# Patient Record
Sex: FEMALE | Race: WHITE | NOT HISPANIC OR LATINO | Employment: OTHER | URBAN - METROPOLITAN AREA
[De-identification: names, ages, dates, MRNs, and addresses within clinical notes are randomized per-mention and may not be internally consistent; named-entity substitution may affect disease eponyms.]

---

## 2017-06-20 ENCOUNTER — ALLSCRIPTS OFFICE VISIT (OUTPATIENT)
Dept: OTHER | Facility: OTHER | Age: 77
End: 2017-06-20

## 2017-08-01 ENCOUNTER — GENERIC CONVERSION - ENCOUNTER (OUTPATIENT)
Dept: OTHER | Facility: OTHER | Age: 77
End: 2017-08-01

## 2017-08-02 ENCOUNTER — ALLSCRIPTS OFFICE VISIT (OUTPATIENT)
Dept: OTHER | Facility: OTHER | Age: 77
End: 2017-08-02

## 2017-08-02 DIAGNOSIS — N39.0 URINARY TRACT INFECTION: ICD-10-CM

## 2017-08-02 DIAGNOSIS — E11.9 TYPE 2 DIABETES MELLITUS WITHOUT COMPLICATIONS (HCC): ICD-10-CM

## 2017-08-02 DIAGNOSIS — I10 ESSENTIAL (PRIMARY) HYPERTENSION: ICD-10-CM

## 2017-08-07 ENCOUNTER — GENERIC CONVERSION - ENCOUNTER (OUTPATIENT)
Dept: OTHER | Facility: OTHER | Age: 77
End: 2017-08-07

## 2017-08-11 ENCOUNTER — GENERIC CONVERSION - ENCOUNTER (OUTPATIENT)
Dept: OTHER | Facility: OTHER | Age: 77
End: 2017-08-11

## 2017-08-15 ENCOUNTER — GENERIC CONVERSION - ENCOUNTER (OUTPATIENT)
Dept: OTHER | Facility: OTHER | Age: 77
End: 2017-08-15

## 2017-08-24 ENCOUNTER — GENERIC CONVERSION - ENCOUNTER (OUTPATIENT)
Dept: OTHER | Facility: OTHER | Age: 77
End: 2017-08-24

## 2017-09-22 ENCOUNTER — ALLSCRIPTS OFFICE VISIT (OUTPATIENT)
Dept: OTHER | Facility: OTHER | Age: 77
End: 2017-09-22

## 2017-09-22 DIAGNOSIS — I50.30 DIASTOLIC CONGESTIVE HEART FAILURE (HCC): ICD-10-CM

## 2017-09-22 DIAGNOSIS — K26.4 DUODENAL ULCER WITH HEMORRHAGE: ICD-10-CM

## 2017-09-22 DIAGNOSIS — J44.9 CHRONIC OBSTRUCTIVE PULMONARY DISEASE (HCC): ICD-10-CM

## 2017-09-22 DIAGNOSIS — R19.7 DIARRHEA: ICD-10-CM

## 2017-09-22 DIAGNOSIS — R93.89 ABNORMAL FINDINGS ON DIAGNOSTIC IMAGING OF OTHER SPECIFIED BODY STRUCTURES: ICD-10-CM

## 2017-10-27 ENCOUNTER — ALLSCRIPTS OFFICE VISIT (OUTPATIENT)
Dept: OTHER | Facility: OTHER | Age: 77
End: 2017-10-27

## 2017-10-28 NOTE — PROGRESS NOTES
Assessment  1  Anemia, pernicious (281 0) (D51 0)   2  (HFpEF) heart failure with preserved ejection fraction (428 9) (I50 30)   3  Type 2 diabetes mellitus (250 00) (E11 9)    Plan  (HFpEF) heart failure with preserved ejection fraction    · Gradient compression stocking, below knee, 15-20 mm Hg, each; Status:Complete;    Done: 27Oct2017  Anemia, pernicious    · BD Luer-Michael Syringe 26G X 5/8 3 ML Miscellaneous; USE AS DIRECTED   · Cyanocobalamin 1000 MCG/ML Injection Solution; Inject 1 ml weekly x 1 month,  then 1 ml every other week x 4 weeks, then monthly  Gastrointestinal hemorrhage associated with duodenal ulcer    · Omeprazole 20 MG Oral Capsule Delayed Release   · Omeprazole 40 MG Oral Capsule Delayed Release; TAKE 1 CAPSULE TWICE  DAILY  Sciatica of right side    · From  Lidocaine 5 % External Patch  To Lidocaine 5 % External Patch APPLY 1  PATCH TO THE AFFECTED AREA AND LEAVE IN PLACE FOR 12 HOURS, THEN  REMOVE AND LEAVE OFF FOR 12 HOURS   · Gabapentin 100 MG Oral Capsule; take 2 capsules 3 times daily    Discussion/Summary    Today, take an extra water pill  Tomorrow and Sunday, take an extra half of a water pill  Chief Complaint  PT HERE FOR FOLLOW UP APPT  PT C/O BACK PAIN, SCIATICA  WANTS SCRIPT FOR LIDOCAINE   Patient is here today for follow up of chronic conditions described in HPI  History of Present Illness  She has macrocytic anemia  She states she used to get B12 shots but has not had them in a while  She has been taking oral B12 without much benefit  She had a a GI bleed in September requiring hospitalization  She has no CP or SOB  has LBP that radiates down the right leg  She is taking Tylenol with good relief  She gets home PT  She is taking gabapentin with fair relief   She does not want Tylenol with codeine or tramadol  DM is under good control  She has no hypoglycemia on her current regimen        Review of Systems    Constitutional: No fever, no chills, feels well, no tiredness, no recent weight gain or weight loss  Eyes: No complaints of eye pain, no red eyes, no eyesight problems, no discharge, no dry eyes, no itching of eyes  ENT: no complaints of earache, no loss of hearing, no nose bleeds, no nasal discharge, no sore throat, no hoarseness  Cardiovascular: No complaints of slow heart rate, no fast heart rate, no chest pain, no palpitations, no leg claudication, no lower extremity edema  Respiratory: No complaints of shortness of breath, no wheezing, no cough, no SOB on exertion, no orthopnea, no PND  Gastrointestinal: No complaints of abdominal pain, no constipation, no nausea or vomiting, no diarrhea, no bloody stools  Genitourinary: No complaints of dysuria, no incontinence, no pelvic pain, no dysmenorrhea, no vaginal discharge or bleeding  Musculoskeletal: No complaints of arthralgias, no myalgias, no joint swelling or stiffness, no limb pain or swelling  Integumentary: No complaints of skin rash or lesions, no itching, no skin wounds, no breast pain or lump  Neurological: No complaints of headache, no confusion, no convulsions, no numbness, no dizziness or fainting, no tingling, no limb weakness, no difficulty walking  Psychiatric: Not suicidal, no sleep disturbance, no anxiety or depression, no change in personality, no emotional problems  Endocrine: No complaints of proptosis, no hot flashes, no muscle weakness, no deepening of the voice, no feelings of weakness  Hematologic/Lymphatic: No complaints of swollen glands, no swollen glands in the neck, does not bleed easily, does not bruise easily  Active Problems  1  (HFpEF) heart failure with preserved ejection fraction (428 9) (I50 30)   2  Abnormal chest xray (793 2) (R93 8)   3  Acute lower UTI (599 0) (N39 0)   4  Central sleep apnea (780 57) (G47 31)   5  COPD (chronic obstructive pulmonary disease) (496) (J44 9)   6  Diarrhea, unspecified type (787 91) (R19 7)   7   Fibromyalgia (729 1) (M79 7)   8  Gastrointestinal hemorrhage associated with duodenal ulcer (532 40) (K26 4)   9  Denied: History of substance abuse   10  Hypertension (401 9) (I10)   11  Denied: History of Mental health disorder   12  Osteoporosis (733 00) (M81 0)   13  Pulmonary hypertension (416 8) (I27 20)   14  Sciatica of right side (724 3) (M54 31)   15  Screening for diabetic retinopathy (V80 2) (Z13 5)   16  Shortness of breath (786 05) (R06 02)   17  Skin rash (782 1) (R21)   18  Type 2 diabetes mellitus (250 00) (E11 9)   19  Urinary incontinence (788 30) (R32)    Past Medical History  1  Denied: History of substance abuse   2  Denied: History of Mental health disorder    Surgical History  1  History of Heart Surgery    Family History  Mother    1  Family history of diabetes mellitus (V18 0) (Z83 3)   2  Family history of hypertension (V17 49) (Z82 49)  Father    3  Family history of cerebrovascular accident (CVA) (V17 1) (Z82 3)  Daughter    4  Family history of malignant neoplasm (V16 9) (Z80 9)  Son    5  Family history of thyroid disease (V18 19) (Z83 49)  Brother    6  Family history of cardiac disorder (V17 49) (Z82 49)  Grandparent    7  Family history of diabetes mellitus (V18 0) (Z83 3)   8  Family history of hypertension (V17 49) (Z82 49)   9  Family history of osteoporosis (V17 81) (Z82 62)  Aunt    10  Family history of lung disease (V19 8) (Z83 6)   11  Family history of malignant neoplasm (V16 9) (Z80 9)  Uncle    12  Family history of lung disease (V19 8) (Z83 6)  Family History    13  Denied: Family history of mental disorder   14  Denied: Family history of substance abuse    Social History   · Former smoker (W47 95) (I15 476)    Current Meds   1  Acetaminophen 325 MG Oral Tablet; Therapy: (Charlet Condon) to Recorded   2  Acetaminophen 650 MG TABS; Therapy: (Charlet Condon) to Recorded   3  Actos 30 MG Oral Tablet; TAKE 1 TABLET ONCE DAILY; Therapy: [id number] to Recorded   4   Aspirin 81 MG TABS;   Therapy: (Recorded:22Sep2017) to Recorded   5  Atorvastatin Calcium 20 MG Oral Tablet; take 1 tablet by mouth once daily; Therapy: (Justin Cortés) to Recorded   6  Bisacodyl 10 MG/30ML ENEM; Therapy: (Justin Cortés) to Recorded   7  Bumetanide 1 MG Oral Tablet; Therapy: (Nabeelsofíase Rogers) to Recorded   8  Combivent Respimat  MCG/ACT Inhalation Aerosol Solution; INHALE 2 PUFFS   Every 6 hours; Therapy: 66Jaw1601 to (Last Rx:83Mml0847) Ordered   9  Doxycycline Hyclate 100 MG Oral Capsule; TAKE 1 CAPSULE EVERY 12 HOURS DAILY; Therapy: 15RKT2569 to ()  Requested for: 84KEQ3407; Last   Rx:59Abt0299 Ordered   10  Ferrous Sulfate 325 (65 Fe) MG Oral Tablet; Take 1 tablet twice daily; Therapy: (Justin Cortés) to Recorded   11  Fluticasone-Salmeterol 100-50 MCG/DOSE MISC; Therapy: (Justin Cortés) to Recorded   12  Folic Acid 417 MCG Oral Tablet; Therapy: (Justin Cortés) to Recorded   13  Gabapentin 100 MG Oral Capsule; TAKE 1 CAPSULE 3 TIMES DAILY; Therapy: 64AVG1584 to (Ashtyn Cuevas)  Requested for: 14OKU3589; Last    Rx:06Oct2017 Ordered   14  Klor-Con 10 10 MEQ Oral Tablet Extended Release; TAKE 1 TABLET DAILY WITH FOOD; Therapy: 43GJU3712 to (Ashtyn Cuevas)  Requested for: 31IHF7724; Last    Rx:06Oct2017 Ordered   15  Klor-Con M10 10 MEQ Oral Tablet Extended Release; TAKE 1 TABLET DAILY  Requested    for: 32AWN6888; Last Rx:06Oct2017 Ordered   16  Lidocaine 5 % External Patch; Therapy: (Justin Cortés) to Recorded   17  Metoprolol Tartrate 25 MG Oral Tablet; Take 1 tablet twice daily; Therapy: (Justin Cortés) to Recorded   18  Myrbetriq 25 MG Oral Tablet Extended Release 24 Hour; Take 1 tablet daily; Therapy: 51YMP7526 to (Jace Salcedo)  Requested for: 25FXT7088 Ordered   19  Nystatin 961637 UNIT/GM External Powder; APPLY SPARINGLY TO AFFECTED AREA(S)    3 TIMES A DAY;     Therapy: 12KGJ0976 to (RCTQFNTE:79GGW2767)  Requested for: 29NJY6520; Last    IS:80UNC7010 Ordered   20  Omeprazole 20 MG Oral Capsule Delayed Release; TAKE 1 CAPSULE DAILY EVERY    MORNING BEFORE BREAKFAST; Therapy: 21RVC4179 to ()  Requested for: 36HDZ1781; Last    Rx:06Oct2017 Ordered   21  Omeprazole 40 MG Oral Capsule Delayed Release; Take 1 capsule twice daily     Requested for: 54PNW2814; Last Rx:06Oct2017 Ordered   22  Oxybutynin Chloride ER 5 MG Oral Tablet Extended Release 24 Hour; Take 1 by moth    daily; Therapy: 22WFG3259 to (Last Rx:06Oct2017)  Requested for: 63MNC7688 Ordered   23  Polyethylene Glycol 3350 Oral Powder; MIX 1 CAPFUL (17GM) IN 8 OUNCES OF WATER,    JUICE, OR TEA AND DRINK DAILY; Therapy: 88Fkk9062 to (Evaluate:29Jan2018); Last Rx:92Ahf1902 Ordered   24  Vitamin B12 TABS; Therapy: (Loly Cook) to Recorded   25  Vitamin D TABS; Therapy: (Recorded:20Jun2017) to Recorded    Allergies  1  No Known Drug Allergies    Vitals  Vital Signs    Recorded: 56JEZ1900 10:27AM   Heart Rate 62   Respiration 14   Systolic 586   Diastolic 68   Height 5 ft 2 in   Weight 178 lb 4 oz   BMI Calculated 32 6   BSA Calculated 1 82   O2 Saturation 92     Physical Exam    Constitutional   General appearance: No acute distress, well appearing and well nourished  Pulmonary   Respiratory effort: No increased work of breathing or signs of respiratory distress  Auscultation of lungs: Clear to auscultation  Cardiovascular   Auscultation of heart: Normal rate and rhythm, normal S1 and S2, without murmurs  Examination of extremities for edema and/or varicosities: Normal     Abdomen   Abdomen: Non-tender, no masses  Liver and spleen: No hepatomegaly or splenomegaly           Signatures   Electronically signed by : Abdi Hough MD; Oct 27 2017 11:17AM EST                       (Author)

## 2018-01-13 VITALS
HEART RATE: 64 BPM | OXYGEN SATURATION: 90 % | HEIGHT: 62 IN | BODY MASS INDEX: 34.04 KG/M2 | WEIGHT: 185 LBS | RESPIRATION RATE: 14 BRPM | DIASTOLIC BLOOD PRESSURE: 78 MMHG | SYSTOLIC BLOOD PRESSURE: 138 MMHG

## 2018-01-13 VITALS
HEIGHT: 62 IN | OXYGEN SATURATION: 92 % | RESPIRATION RATE: 14 BRPM | BODY MASS INDEX: 32.8 KG/M2 | DIASTOLIC BLOOD PRESSURE: 68 MMHG | WEIGHT: 178.25 LBS | HEART RATE: 62 BPM | SYSTOLIC BLOOD PRESSURE: 118 MMHG

## 2018-01-13 NOTE — MISCELLANEOUS
Assessment    1  Sciatica of right side (724 3) (M54 31)   2  Urinary incontinence (788 30) (R32)   3  Gastrointestinal hemorrhage associated with duodenal ulcer (532 40) (K26 4)    Plan  Gastrointestinal hemorrhage associated with duodenal ulcer    · Omeprazole 20 MG Oral Capsule Delayed Release; TAKE 1 CAPSULE DAILY  EVERY MORNING BEFORE BREAKFAST   Rx By: Sarah Mike; Dispense: 90 Days ; #:90 Capsule Delayed Release; Refill: 3; For: Gastrointestinal hemorrhage associated with duodenal ulcer; DUSTIN = N; Print Rx   · (1) CBC/PLT/DIFF; Status:Active; Requested for:65Bap7795;    Perform:[facility]; XYB:[id number]; Ordered; For:Gastrointestinal hemorrhage associated with duodenal ulcer; Ordered By:Antonia Corona;   · (1) COMPREHENSIVE METABOLIC PANEL; Status:Active; Requested for:22Sep2017;    Perform:[facility]; LY:[id number]; Ordered; For:Gastrointestinal hemorrhage associated with duodenal ulcer; Ordered By:Antonia Corona; Health Maintenance    · Valsartan-Hydrochlorothiazide 160-12 5 MG Oral Tablet   Rx By: Sarah Mike; Dispense: 30 Days ; #:30 Tablet; Refill: 5; For: Health Maintenance; DUSTIN = N; Sent To: [facility]  Sciatica of right side    · Gabapentin 100 MG Oral Capsule; TAKE 1 CAPSULE 3 TIMES DAILY   Rx By: Sarah Mike; Dispense: 30 Days ; #:90 Capsule; Refill: 5; For: Sciatica of right side; DUSTIN = N; Verified Transmission to [facility] [address]; Last Updated By: System, [employer]; 9/22/2017 11:34:52 AM  Type 2 diabetes mellitus    · Glimepiride 1 MG Oral Tablet   Rx By: Sarah Mike; Dispense: 30 Days ; #:90 Tablet; Refill: 3; For: Type 2 diabetes mellitus; DUSTIN = N; Sent To: [facility]   · Voltaren 1 % GEL   Dispense: 0 Days ; #: Sufficient GEL; Refill: 0; For: Type 2 diabetes mellitus; DUSTIN = N; Record;  Last Updated By: Sarah Mike; 9/22/2017 11:31:45 AM  Urinary incontinence    · Myrbetriq 25 MG Oral Tablet Extended Release 24 Hour; Take 1 tablet daily   Rx By: Cm Shah; Dispense: 0 Days ; #:30 Tablet Extended Release 24 Hour; Refill: 5; For: Urinary incontinence; DUSTIN = N; Verified Transmission to [address] [facility]; Last Updated By: System, [employer]; 9/22/2017 11:39:00 AM   · Oxybutynin Chloride ER 5 MG Oral Tablet Extended Release 24 Hour; Take 1 by  moth daily   Rx By: Cm Shah; Dispense: 0 Days ; #:30 Tablet Extended Release 24 Hour; Refill: 5; For: Urinary incontinence; DUSTIN = N; Verified Transmission to [address]; Last Updated By: System, [employer]; 9/22/2017 11:42:25 AM    Discussion/Summary  Medication SE Review and Pt Understands Tx: Possible side effects of new medications were reviewed with the patient/guardian today  The treatment plan was reviewed with the patient/guardian  The patient/guardian understands and agrees with the treatment plan      History of Present Illness  TCM Communication [facility]: The patient is being contacted for follow-up after hospitalization and GI bleed  Hospital records were reviewed  She was hospitalized at [address]  The dates of hospitalization: 8/14/17-8/23/17 [facility], date of admission: 8/23/17, date of discharge: 9/20/17, She was admitted to [facility] 8/14-8/23  She was transferred to [facility] and discharged to home 9/20  She was discharged to home, to a rehabilitation center  Medications reviewed and updated today  She scheduled a follow up appointment     Symptoms: weakness and fatigue, but no fever, no dizziness, no headache, no cough, no shortness of breath, no chest pain, no back pain on left side, no back pain on right side, no arm pain left side, no arm pain on right side, no leg pain on left side, no leg pain on right side, no upper abdominal pain, no middle abdominal pain, no lower abdominal pain, no rash:, no anorexia, no nausea, no vomiting, no loose stools, no constipation, no pain with urinating, no incisional pain and no wound drainage  Referrals Needed:  home care  Counseling was provided to the patient and patient's family  Topics counseled included diagnostic results, prognosis and activities of daily living  Communication performed and completed by   HPI: She had an appointment today for AWV  She was just discharged from the NH less than 48 hours ago  I converted her visit to a ASA  She was in her usual state of health until just prior to admission  She fell twice (syncope)  After the second fall, she was taken to [facility] ER  She was found to be severely anemic and she was transferred to [facility]  She was found to have a bleeding duodenal ulcer  She was intubated  She was transfused 3 units of PRBCs  She had ANGEL and shock  She was taking Aleve for arthritis  Valsartan was held in the hospital      She is on Actos  She is not a candidate for metformin (elevated creatinine)  She had side effects from Amaryl  Her blood sugars are at goal on Actos, however  She has low back pain that radiates down the right leg  The pain is electrical in nature  She rates it a 7-8 out of 10  It comes and goes  "It doesn't stay all that long "    Her COPD is well controlled  She was seen by Dr Sarita Rubinstein (pulmonary)  No changes were made to medical regimen  Her BP is at goal  She has no CP or SOB  She was started on Myrbetriq in the nursing home  She had a good response to the medication  Review of Systems  Complete-Female:   Constitutional: No fever, no chills, feels well, no tiredness, no recent weight gain or weight loss  Eyes: No complaints of eye pain, no red eyes, no eyesight problems, no discharge, no dry eyes, no itching of eyes  ENT: no complaints of earache, no loss of hearing, no nose bleeds, no nasal discharge, no sore throat, no hoarseness  Cardiovascular: No complaints of slow heart rate, no fast heart rate, no chest pain, no palpitations, no leg claudication, no lower extremity edema     Respiratory: No complaints of shortness of breath, no wheezing, no cough, no SOB on exertion, no orthopnea, no PND  Gastrointestinal: No complaints of abdominal pain, no constipation, no nausea or vomiting, no diarrhea, no bloody stools  Genitourinary: No complaints of dysuria, no incontinence, no pelvic pain, no dysmenorrhea, no vaginal discharge or bleeding  Musculoskeletal: No complaints of arthralgias, no myalgias, no joint swelling or stiffness, no limb pain or swelling  Integumentary: No complaints of skin rash or lesions, no itching, no skin wounds, no breast pain or lump  Neurological: No complaints of headache, no confusion, no convulsions, no numbness, no dizziness or fainting, no tingling, no limb weakness, no difficulty walking  Psychiatric: Not suicidal, no sleep disturbance, no anxiety or depression, no change in personality, no emotional problems  Endocrine: No complaints of proptosis, no hot flashes, no muscle weakness, no deepening of the voice, no feelings of weakness  Hematologic/Lymphatic: No complaints of swollen glands, no swollen glands in the neck, does not bleed easily, does not bruise easily  Active Problems    1  (HFpEF) heart failure with preserved ejection fraction (428 9) (I50 30)   2  Acute lower UTI (599 0) (N39 0)   3  Central sleep apnea (780 57) (G47 31)   4  COPD (chronic obstructive pulmonary disease) (496) (J44 9)   5  Fibromyalgia (729 1) (M79 7)   6  Gastrointestinal hemorrhage associated with duodenal ulcer (532 40) (K26 4)   7  Denied: History of substance abuse   8  Hypertension (401 9) (I10)   9  Denied: History of Mental health disorder   10  Osteoporosis (733 00) (M81 0)   11  Pulmonary hypertension (416 8) (I27 2)   12  Screening for diabetic retinopathy (V80 2) (Z13 5)   13  Shortness of breath (786 05) (R06 02)   14  Type 2 diabetes mellitus (250 00) (E11 9)    Past Medical History    1  Denied: History of substance abuse   2   Denied: History of Mental health disorder    Surgical History    1  History of Heart Surgery    Family History  Mother    1  Family history of diabetes mellitus (V18 0) (Z83 3)   2  Family history of hypertension (V17 49) (Z82 49)  Father    3  Family history of cerebrovascular accident (CVA) (V17 1) (Z82 3)  Daughter    4  Family history of malignant neoplasm (V16 9) (Z80 9)  Son    5  Family history of thyroid disease (V18 19) (Z83 49)  Brother    6  Family history of cardiac disorder (V17 49) (Z82 49)  Grandparent    7  Family history of diabetes mellitus (V18 0) (Z83 3)   8  Family history of hypertension (V17 49) (Z82 49)   9  Family history of osteoporosis (V17 81) (Z82 62)  Aunt    10  Family history of lung disease (V19 8) (Z83 6)   11  Family history of malignant neoplasm (V16 9) (Z80 9)  Uncle    12  Family history of lung disease (V19 8) (Z83 6)  Family History    13  Denied: Family history of mental disorder   14  Denied: Family history of substance abuse    Social History    · Former smoker (H54 40) (N34 662)    Current Meds   1  Acetaminophen 325 MG Oral Tablet; Therapy: (Devin Mejia) to Recorded   2  Acetaminophen 650 MG TABS; Therapy: (Devin Mejia) to Recorded   3  Actos 30 MG Oral Tablet; TAKE 1 TABLET ONCE DAILY; Therapy: 55WNW6073 to Recorded   4  Aspirin 81 MG TABS; Therapy: (Devin Mejia) to Recorded   5  Atorvastatin Calcium 20 MG Oral Tablet; take 1 tablet by mouth once daily; Therapy: (Devin Mejia) to Recorded   6  Bisacodyl 10 MG/30ML ENEM; Therapy: (Devin Mejia) to Recorded   7  Bumetanide 1 MG Oral Tablet; Therapy: (Jb Pina) to Recorded   8  Combivent Respimat  MCG/ACT Inhalation Aerosol Solution; INHALE 2 PUFFS   Every 6 hours; Therapy: 02Aug2017 to (Last Rx:02Aug2017) Ordered   9  Ferrous Sulfate 325 (65 Fe) MG Oral Tablet; Take 1 tablet twice daily; Therapy: (Devin Mejia) to Recorded   10   Fluticasone-Salmeterol 100-50 MCG/DOSE MISC; Therapy: (Lashell Jackman) to Recorded   11  Folic Acid 309 MCG Oral Tablet; Therapy: (Lashell Jackman) to Recorded   12  Glimepiride 1 MG Oral Tablet; take 1 tablet by mouth every day; Therapy: 02Aug2017 to (Evaluate:30Nov2017)  Requested for: 02Aug2017; Last    Rx:02Aug2017 Ordered   13  Lidocaine 5 % External Patch; Therapy: (Lashell Jackman) to Recorded   14  Metoprolol Tartrate 25 MG Oral Tablet; Take 1 tablet twice daily; Therapy: (Lashell Jackman) to Recorded   15  Omeprazole 40 MG Oral Capsule Delayed Release; Take 1 capsule twice daily; Therapy: (Lashell Jackman) to Recorded   16  Polyethylene Glycol 3350 Oral Powder; MIX 1 CAPFUL (17GM) IN 8 OUNCES OF WATER,    JUICE, OR TEA AND DRINK DAILY; Therapy: 02Aug2017 to (Evaluate:29Jan2018); Last Rx:02Aug2017 Ordered   17  Potassium Chloride [place] ER 20 MEQ Oral Tablet Extended Release; Therapy: (Lashell Jackman) to Recorded   18  Valsartan-Hydrochlorothiazide 160-12 5 MG Oral Tablet; take 1 tablet once a day; Therapy: 02Aug2017 to (Evaluate:29Jan2018)  Requested for: 02Aug2017; Last    Rx:02Aug2017 Ordered   19  Vitamin B12 TABS; Therapy: (Jim Fulling) to Recorded   20  Vitamin D TABS; Therapy: (Jim Fulling) to Recorded   21  Voltaren 1 % GEL; Therapy: (Recorded:20Jun2017) to Recorded    Allergies    1  No Known Drug Allergies    Vitals  Signs   Recorded: 39KCI6653 11:00AM   Heart Rate: 85  Respiration: 16  Systolic: 816  Diastolic: 70  Height: 5 ft 2 in  O2 Saturation: 90    Physical Exam    Constitutional   General appearance: No acute distress, well appearing and well nourished  Pulmonary   Respiratory effort: No increased work of breathing or signs of respiratory distress  Auscultation of lungs: Clear to auscultation  Cardiovascular   Auscultation of heart: Normal rate and rhythm, normal S1 and S2, without murmurs      Examination of extremities for edema and/or varicosities: Normal  Abdomen   Abdomen: Non-tender, no masses  Liver and spleen: No hepatomegaly or splenomegaly           Future Appointments    Date/Time Provider Specialty Site   10/27/2017 10:00 AM Sailaja Bell MD [facility] [id number]     Signatures   Electronically signed by : Oly Herrmann MD; Sep 22 2017 12:22PM EST                       (Author)

## 2018-01-14 NOTE — MISCELLANEOUS
History of Present Illness  TCM Communication [facility]: The patient is being contacted for follow-up after hospitalization and PT CONTACTED FOR ASA  SPOKE WITH   PT WAS DISCHARGED YESTERDAY 8/23/2017 TO [facility]  Hospital records were not available  She was hospitalized at [facility]  The date of admission: 8/14/2017, date of discharge: 8/23/2017  Diagnosis: GASTROINTESTINAL HEMORRHAGE ASSOCIATED WITH DUODENAL ULCER  She was discharged to a nursing home, [address]  Medications were not reviewed today  She refused a follow up appointment due to PT Kiet Wynne 37  The patient is currently asymptomatic  Referrals Needed:  N/A  Counseling was provided to patient's family  PTS SPOUSE  Communication performed and completed by NS      Active Problems    1  (HFpEF) heart failure with preserved ejection fraction (428 9) (I50 30)   2  Acute lower UTI (599 0) (N39 0)   3  Central sleep apnea (780 57) (G47 31)   4  COPD (chronic obstructive pulmonary disease) (496) (J44 9)   5  Fibromyalgia (729 1) (M79 7)   6  Denied: History of substance abuse   7  Hypertension (401 9) (I10)   8  Denied: History of Mental health disorder   9  Osteoporosis (733 00) (M81 0)   10  Pulmonary hypertension (416 8) (I27 2)   11  Screening for diabetic retinopathy (V80 2) (Z13 5)   12  Shortness of breath (786 05) (R06 02)   13  Type 2 diabetes mellitus (250 00) (E11 9)    Past Medical History    1  Denied: History of substance abuse   2  Denied: History of Mental health disorder    Surgical History    1  History of Heart Surgery    Family History  Mother    1  Family history of diabetes mellitus (V18 0) (Z83 3)   2  Family history of hypertension (V17 49) (Z82 49)  Father    3  Family history of cerebrovascular accident (CVA) (V17 1) (Z82 3)  Daughter    4  Family history of malignant neoplasm (V16 9) (Z80 9)  Son    5   Family history of thyroid disease (V18 19) (Z83 49)  Brother    6  Family history of cardiac disorder (V17 49) (Z82 49)  Grandparent    7  Family history of diabetes mellitus (V18 0) (Z83 3)   8  Family history of hypertension (V17 49) (Z82 49)   9  Family history of osteoporosis (V17 81) (Z82 62)  Aunt    10  Family history of lung disease (V19 8) (Z83 6)   11  Family history of malignant neoplasm (V16 9) (Z80 9)  Uncle    12  Family history of lung disease (V19 8) (Z83 6)  Family History    13  Denied: Family history of mental disorder   14  Denied: Family history of substance abuse    Social History    · Former smoker (A91 47) (Q01 494)    Current Meds   1  Advair Diskus 250-50 MCG/DOSE Inhalation Aerosol Powder Breath Activated; INHALE 1   PUFF EVERY 12 HOURS; Therapy: 02Aug2017 to (Last Rx:02Aug2017)  Requested for: 02Aug2017 Ordered   2  Atorvastatin Calcium 20 MG Oral Tablet; Therapy: (Cassidy Olmstead) to Recorded   3  Bumetanide 1 MG Oral Tablet; Therapy: (Cassidy Olmstead) to Recorded   4  Combivent Respimat  MCG/ACT Inhalation Aerosol Solution; INHALE 2 PUFFS   Every 6 hours; Therapy: 02Aug2017 to (Last Rx:02Aug2017) Ordered   5  Doxycycline Hyclate 100 MG Oral Capsule; TAKE 1 CAPSULE EVERY 12 HOURS DAILY; Therapy: 02Aug2017 to (Evaluate:[id number])  Requested for: 02Aug2017; Last   Rx:02Aug2017 Ordered   6  Ecotrin 325 MG Oral Tablet Delayed Release; Therapy: (Cassidy Olmstead) to Recorded   7  Folic Acid TABS; Therapy: (Cassidy Olmstead) to Recorded   8  Glimepiride 1 MG Oral Tablet; take 1 tablet by mouth every day; Therapy: 02Aug2017 to (Evaluate:30Nov2017)  Requested for: 02Aug2017; Last   Rx:02Aug2017 Ordered   9  LORazepam 1 MG Oral Tablet (Ativan); TAKE 1 TABLET Every 8 hours; Therapy: 02Aug2017 to (Evaluate:[id number]); Last Rx:02Aug2017 Ordered   10  Metoprolol Tartrate 50 MG Oral Tablet; TAKE 1 TABLET EVERY 12 HOURS; Therapy: [id number] to (Evaluate:29Jan2018) Recorded   11   Polyethylene Glycol 3350 Oral Powder; MIX 1 CAPFUL (17GM) IN 8 OUNCES OF WATER,    JUICE, OR TEA AND DRINK DAILY; Therapy: 02Aug2017 to (Evaluate:29Jan2018); Last Rx:02Aug2017 Ordered   12  PredniSONE 10 MG Oral Tablet; 4 tablets daily for 3 days, then    3 tablets daily for 3 days, then    2 tablets daily for 3 days, then    1 tablet daily for 3 days; Therapy: 02Aug2017 to (Last Rx:02Aug2017) Ordered   13  Valsartan-Hydrochlorothiazide 160-12 5 MG Oral Tablet; take 1 tablet once a day; Therapy: 02Aug2017 to (Evaluate:29Jan2018)  Requested for: 02Aug2017; Last    Rx:02Aug2017 Ordered   14  Vitamin B12 TABS; Therapy: (Anselmo Ashton) to Recorded   15  Vitamin D TABS; Therapy: (Anselmo Ashton) to Recorded   16  Voltaren 1 % GEL; Therapy: (Recorded:20Jun2017) to Recorded    Allergies    1  No Known Drug Allergies    Future Appointments    Date/Time Provider Specialty Site   09/22/2017 10:30 AM Valentín Charles MD Family Medicine [address] [place] [address]   09/12/2017 11:00 AM DEMOND Fox   Pulmonary Medicine [address] [facility]     Signatures   Electronically signed by : Jessica Matt MD; Sep 22 2017 11:04AM EST                       (Author)

## 2018-01-15 VITALS
OXYGEN SATURATION: 94 % | BODY MASS INDEX: 36.87 KG/M2 | HEART RATE: 63 BPM | DIASTOLIC BLOOD PRESSURE: 80 MMHG | WEIGHT: 200.38 LBS | RESPIRATION RATE: 16 BRPM | SYSTOLIC BLOOD PRESSURE: 142 MMHG | HEIGHT: 62 IN

## 2018-01-22 VITALS
OXYGEN SATURATION: 90 % | DIASTOLIC BLOOD PRESSURE: 70 MMHG | RESPIRATION RATE: 16 BRPM | SYSTOLIC BLOOD PRESSURE: 124 MMHG | HEIGHT: 62 IN | HEART RATE: 85 BPM

## 2018-01-30 RX ORDER — OXYBUTYNIN CHLORIDE 5 MG/1
TABLET, EXTENDED RELEASE ORAL
COMMUNITY
Start: 2017-09-22 | End: 2018-10-13 | Stop reason: SDUPTHER

## 2018-01-30 RX ORDER — POTASSIUM CHLORIDE 750 MG/1
1 TABLET, EXTENDED RELEASE ORAL DAILY
COMMUNITY
End: 2018-06-19 | Stop reason: SDUPTHER

## 2018-01-30 RX ORDER — NYSTATIN 100000 [USP'U]/G
POWDER TOPICAL 3 TIMES DAILY
COMMUNITY
Start: 2017-09-27 | End: 2022-06-27

## 2018-01-30 RX ORDER — GABAPENTIN 100 MG/1
2 CAPSULE ORAL 3 TIMES DAILY
COMMUNITY
Start: 2017-09-22 | End: 2018-06-19 | Stop reason: SDUPTHER

## 2018-01-30 RX ORDER — OMEPRAZOLE 40 MG/1
1 CAPSULE, DELAYED RELEASE ORAL DAILY
COMMUNITY
Start: 2017-10-27 | End: 2018-06-19 | Stop reason: SDUPTHER

## 2018-01-30 RX ORDER — SYRINGE WITH NEEDLE, 1 ML 25GX5/8"
SYRINGE, EMPTY DISPOSABLE MISCELLANEOUS
Refills: 0 | COMMUNITY
Start: 2018-01-03 | End: 2018-06-19 | Stop reason: SDUPTHER

## 2018-01-30 RX ORDER — BUMETANIDE 1 MG/1
0.5 TABLET ORAL EVERY OTHER DAY
COMMUNITY
End: 2018-06-19 | Stop reason: SDUPTHER

## 2018-01-30 RX ORDER — UBIDECARENONE 75 MG
CAPSULE ORAL
COMMUNITY
End: 2022-06-27 | Stop reason: ALTCHOICE

## 2018-01-30 RX ORDER — LANOLIN ALCOHOL/MO/W.PET/CERES
CREAM (GRAM) TOPICAL
COMMUNITY
End: 2020-02-21 | Stop reason: SDUPTHER

## 2018-01-30 RX ORDER — ATORVASTATIN CALCIUM 20 MG/1
1 TABLET, FILM COATED ORAL DAILY
COMMUNITY
End: 2018-06-19 | Stop reason: SDUPTHER

## 2018-01-30 RX ORDER — PIOGLITAZONEHYDROCHLORIDE 30 MG/1
1 TABLET ORAL DAILY
COMMUNITY
Start: 2017-09-22 | End: 2018-06-19 | Stop reason: SDUPTHER

## 2018-01-30 RX ORDER — FERROUS SULFATE 325(65) MG
1 TABLET ORAL
COMMUNITY
End: 2022-06-27 | Stop reason: ALTCHOICE

## 2018-01-30 RX ORDER — POLYETHYLENE GLYCOL 3350
GRANULES (GRAM) MISCELLANEOUS DAILY
COMMUNITY
Start: 2017-08-02 | End: 2018-06-19

## 2018-01-30 RX ORDER — LIDOCAINE 50 MG/G
1 PATCH TOPICAL
COMMUNITY
End: 2018-04-19 | Stop reason: SDUPTHER

## 2018-02-02 ENCOUNTER — OFFICE VISIT (OUTPATIENT)
Dept: FAMILY MEDICINE CLINIC | Facility: CLINIC | Age: 78
End: 2018-02-02
Payer: MEDICARE

## 2018-02-02 VITALS
DIASTOLIC BLOOD PRESSURE: 68 MMHG | OXYGEN SATURATION: 96 % | RESPIRATION RATE: 14 BRPM | HEART RATE: 58 BPM | SYSTOLIC BLOOD PRESSURE: 122 MMHG

## 2018-02-02 DIAGNOSIS — I10 ESSENTIAL HYPERTENSION: ICD-10-CM

## 2018-02-02 DIAGNOSIS — E11.9 TYPE 2 DIABETES MELLITUS WITHOUT COMPLICATION, WITHOUT LONG-TERM CURRENT USE OF INSULIN (HCC): Primary | ICD-10-CM

## 2018-02-02 DIAGNOSIS — M75.81 TENDINITIS OF RIGHT ROTATOR CUFF: ICD-10-CM

## 2018-02-02 PROBLEM — E87.0 HYPERNATREMIA: Status: ACTIVE | Noted: 2017-08-19

## 2018-02-02 PROBLEM — I50.30 (HFPEF) HEART FAILURE WITH PRESERVED EJECTION FRACTION (HCC): Status: ACTIVE | Noted: 2017-08-02

## 2018-02-02 PROBLEM — R32 URINARY INCONTINENCE: Status: ACTIVE | Noted: 2017-09-22

## 2018-02-02 PROBLEM — R06.02 SHORTNESS OF BREATH: Status: ACTIVE | Noted: 2017-06-20

## 2018-02-02 PROBLEM — R93.89 ABNORMAL CHEST XRAY: Status: ACTIVE | Noted: 2017-10-03

## 2018-02-02 PROBLEM — J96.01 ACUTE RESPIRATORY FAILURE WITH HYPOXIA (HCC): Status: ACTIVE | Noted: 2017-07-23

## 2018-02-02 PROBLEM — F99 MENTAL DISORDER: Status: ACTIVE | Noted: 2017-06-20

## 2018-02-02 PROBLEM — M54.31 SCIATICA OF RIGHT SIDE: Status: ACTIVE | Noted: 2017-09-22

## 2018-02-02 PROBLEM — I25.10 CAD (CORONARY ARTERY DISEASE): Status: ACTIVE | Noted: 2017-07-23

## 2018-02-02 PROBLEM — I27.20 PULMONARY HYPERTENSION (HCC): Status: ACTIVE | Noted: 2017-08-02

## 2018-02-02 PROBLEM — R21 SKIN RASH: Status: ACTIVE | Noted: 2017-09-27

## 2018-02-02 PROBLEM — R19.7 DIARRHEA: Status: ACTIVE | Noted: 2017-10-03

## 2018-02-02 PROBLEM — J96.00 ACUTE RESPIRATORY FAILURE (HCC): Status: ACTIVE | Noted: 2017-08-15

## 2018-02-02 PROBLEM — K26.4 GASTROINTESTINAL HEMORRHAGE ASSOCIATED WITH DUODENAL ULCER: Status: ACTIVE | Noted: 2017-08-24

## 2018-02-02 PROBLEM — J44.9 COPD (CHRONIC OBSTRUCTIVE PULMONARY DISEASE) (HCC): Status: ACTIVE | Noted: 2017-08-02

## 2018-02-02 PROBLEM — R41.0 DELIRIUM: Status: ACTIVE | Noted: 2017-08-19

## 2018-02-02 PROBLEM — D62 ACUTE BLOOD LOSS ANEMIA: Status: ACTIVE | Noted: 2017-08-15

## 2018-02-02 PROBLEM — D51.0 ANEMIA, PERNICIOUS: Status: ACTIVE | Noted: 2017-10-27

## 2018-02-02 PROBLEM — N17.9 AKI (ACUTE KIDNEY INJURY) (HCC): Status: ACTIVE | Noted: 2017-08-15

## 2018-02-02 PROBLEM — E43 SEVERE MALNUTRITION (HCC): Status: ACTIVE | Noted: 2017-08-16

## 2018-02-02 PROBLEM — M79.7 FIBROMYALGIA: Status: ACTIVE | Noted: 2017-06-20

## 2018-02-02 PROBLEM — R57.9 SHOCK (HCC): Status: ACTIVE | Noted: 2017-08-15

## 2018-02-02 PROBLEM — G47.31 CENTRAL SLEEP APNEA: Status: ACTIVE | Noted: 2017-08-02

## 2018-02-02 PROBLEM — N39.0 ACUTE LOWER UTI: Status: ACTIVE | Noted: 2017-07-21

## 2018-02-02 PROBLEM — Z95.1 S/P CABG X 2: Status: ACTIVE | Noted: 2017-07-23

## 2018-02-02 PROBLEM — I50.9 HEART FAILURE (HCC): Status: ACTIVE | Noted: 2017-07-23

## 2018-02-02 PROBLEM — M81.0 OSTEOPOROSIS: Status: ACTIVE | Noted: 2017-06-20

## 2018-02-02 LAB — SL AMB POCT HEMOGLOBIN AIC: 5.5

## 2018-02-02 PROCEDURE — 83036 HEMOGLOBIN GLYCOSYLATED A1C: CPT | Performed by: FAMILY MEDICINE

## 2018-02-02 PROCEDURE — 99214 OFFICE O/P EST MOD 30 MIN: CPT | Performed by: FAMILY MEDICINE

## 2018-02-02 RX ORDER — OMEPRAZOLE 40 MG/1
CAPSULE, DELAYED RELEASE ORAL
COMMUNITY
Start: 2017-10-27 | End: 2018-06-19

## 2018-02-02 NOTE — PROGRESS NOTES
Diabetic Foot Exam    Patient's shoes and socks removed  Right Foot/Ankle   Right Foot Inspection  Skin Exam: skin normal and skin intact no dry skin, no warmth, no callus, no erythema, no maceration, no abnormal color, no pre-ulcer, no ulcer and no callus                          Toe Exam: ROM and strength within normal limits  Sensory   Vibration: diminished  Proprioception: intact   Monofilament testing: intact  Vascular  Capillary refills: < 3 seconds  The right DP pulse is 1+  The right PT pulse is 1+  Left Foot/Ankle  Left Foot Inspection  Skin Exam: skin normal and skin intactno dry skin, no warmth, no erythema, no maceration, normal color, no pre-ulcer, no ulcer and no callus                         Toe Exam: ROM and strength within normal limits                   Sensory   Vibration: diminished  Proprioception: intact  Monofilament: intact  Vascular  Capillary refills: < 3 seconds  The left DP pulse is 1+  The left PT pulse is 1+  Assign Risk Category:  No deformity present; No loss of protective sensation;  No weak pulses       Risk: 0

## 2018-02-02 NOTE — PATIENT INSTRUCTIONS
Calcific Tendinitis   WHAT YOU NEED TO KNOW:   What is calcific tendinitis? Calcific tendinitis is a condition that occurs when calcium collects in the tendons of the shoulder  Tendons are bands of tissue that connect muscle to bone  The calcium can make the tendons swell and cause severe pain  What increases my risk for calcific tendinitis? There is no known cause of calcific tendinitis  The following may increase your risk:  · Family history of calcific tendinitis    · Age between 27 and 48 years    · Female gender    · Lack of physical activity    · Medical conditions, such as diabetes  What are the signs and symptoms of calcific tendinitis? Signs and symptoms may be sudden and severe, lasting several weeks  Symptoms can also be mild and last several months  · Pain in your shoulder that may spread to your neck    · Trouble sleeping because of pain     · Stiffness or weakness in your arm or shoulder    · Decreased arm movement  How is calcific tendinitis diagnosed? Your healthcare provider will check how well you can move your shoulder and arm  He may check the function of your elbow, wrist, and hand  He may compare the movement and strength of your painful shoulder against your healthy shoulder  You may also need the following tests:  · An x-ray  may show calcium buildup in your shoulder  · An ultrasound  uses sound waves to show pictures on a monitor  An ultrasound may be done to show the cause of your pain  · An MRI  takes pictures of your shoulder  An MRI may show calcium in your shoulder or another cause of your pain  You may be given dye to help the parts of your shoulder show up better in the pictures  Tell the healthcare provider if you have ever had an allergic reaction to contrast dye  Do not enter the MRI room with anything metal  Metal can cause serious injury  Tell the healthcare provider if you have any metal in or on your body  How is calcific tendinitis treated?   Calcific tendinitis may go away on its own  The body absorbs the calcium, and the tendon heals  You may need any of the following:  · Medicines:      ¨ NSAIDs  may decrease swelling and pain  This medicine can be bought with or without a doctor's order  This medicine can cause stomach bleeding or kidney problems in certain people  If you take blood thinner medicine, always ask your healthcare provider if NSAIDs are safe for you  Always read the medicine label and follow the directions on it before using this medicine  ¨ Steroids  help decrease inflammation  You may be given steroids as a pill or a shot in your shoulder  · Needling  is a procedure used to break up the calcium or remove it  Your healthcare provider inserts one or more needles through your skin and into your shoulder  · Extracorporeal shockwave therapy (ESWT)  uses sound waves aimed at the calcium to help break it up  The pieces of calcium are then absorbed back into the body  ESWT may be done if symptoms last 3 months or longer  · Surgery  may be needed to remove the calcium if you have severe pain for several months and other treatments have not helped  Damaged tissue or bone may also be removed  How can I manage my symptoms? · Go to physical therapy  A physical therapist can teach you exercises to help improve movement and strength, and to decrease pain  · Apply ice  on your shoulder for 15 to 20 minutes every hour or as directed  Use an ice pack, or put crushed ice in a plastic bag  Cover it with a towel  Ice helps prevent tissue damage and decreases swelling and pain  · Apply heat  on your shoulder for 20 to 30 minutes every 2 hours for as many days as directed  Heat helps decrease pain and muscle spasms  · Rest your arm  Healthcare providers may have you place an item, such as a ball, between your side and elbow while you rest  This may help decrease stiffness and pain  When should I contact my healthcare provider?    · You have worse pain and stiffness in your shoulder  · You have new or more trouble moving your arm  · You have questions or concerns about your condition or care  When should I seek immediate care or call 911? · You cannot move your arm  · You have severe pain in your arm or shoulder  CARE AGREEMENT:   You have the right to help plan your care  Learn about your health condition and how it may be treated  Discuss treatment options with your caregivers to decide what care you want to receive  You always have the right to refuse treatment  The above information is an  only  It is not intended as medical advice for individual conditions or treatments  Talk to your doctor, nurse or pharmacist before following any medical regimen to see if it is safe and effective for you  © 2017 [employer]  Information is for End User's use only and may not be sold, redistributed or otherwise used for commercial purposes  All illustrations and images included in CareNotes® are the copyrighted property of [employer]  or [employer]

## 2018-02-02 NOTE — PROGRESS NOTES
Assessment/Plan:    No problem-specific Assessment & Plan notes found for this encounter  Diagnoses and all orders for this visit:    Type 2 diabetes mellitus without complication, without long-term current use of insulin (HCC)    Essential hypertension    Tendinitis of right rotator cuff    Other orders  -     pioglitazone (ACTOS) 30 mg tablet; Take 1 tablet by mouth daily  -     aspirin 81 MG tablet; Take by mouth  -     atorvastatin (LIPITOR) 20 mg tablet; Take 1 tablet by mouth daily  -     cholecalciferol (VITAMIN D3) 1,000 units tablet; Take by mouth  -     cyanocobalamin (VITAMIN B-12) 100 mcg tablet; Take by mouth  -     B-D 3CC LUER-MARISABEL SYR 26GX5/8" 26G X 5/8" 3 ML MISC;   -     Polyethylene Glycol 3350 GRAN; Take by mouth daily  -     omeprazole (PriLOSEC) 40 MG capsule; Take 1 capsule by mouth 2 (two) times a day  -     oxybutynin (DITROPAN-XL) 5 mg 24 hr tablet; Take by mouth  -     nystatin (MYCOSTATIN) powder; Apply topically 3 (three) times a day  -     metoprolol tartrate (LOPRESSOR) 25 mg tablet; Take 1 tablet by mouth 2 (two) times a day  -     lidocaine (LIDODERM) 5 %; Apply 1 patch topically  -     potassium chloride (KLOR-CON M10) 10 mEq tablet; Take 1 tablet by mouth daily  -     gabapentin (NEURONTIN) 100 mg capsule; Take 2 capsules by mouth 3 (three) times a day  -     folic acid (FOLVITE) 609 mcg tablet; Take by mouth  -     fluticasone-salmeterol (ADVAIR) 100-50 mcg/dose; Inhale  -     ferrous sulfate 325 (65 Fe) mg tablet; Take 1 tablet by mouth 2 (two) times a day  -     bumetanide (BUMEX) 1 mg tablet; Take by mouth  -     ipratropium-albuterol (COMBIVENT RESPIMAT) inhaler; Inhale every 6 (six) hours  -     omeprazole (PriLOSEC) 40 MG capsule;           Subjective:      Patient ID: Soco Carreno is a 65 y o  female  She has type 2 DM and takes Actos  She has good glycemic control and no hypoglycemia  She has no side effects from her medications    She sees Dr Roland Hastings and had an eye exam within the last year  Her A1c is at goal today at 5 5 percent  Her BP is at goal on her current regimen  She Has no headache or vision changes  She has no chest pain or shortness of breath  Her lipids are at goal on 20 mg of Lipitor  She has no increased myalgia or muscle weakness  She has no nausea or right upper quadrant pain  She has right arm and shoulder pain  It hurts to roll over on it and with raising her arm  The following portions of the patient's history were reviewed and updated as appropriate:   She  has a past medical history of Delirium (8/19/2017)  She  does not have any pertinent problems on file  She  has a past surgical history that includes Cardiac surgery  Her family history includes Cancer in her daughter and other; Diabetes in her mother and other; Heart disease in her brother; Hypertension in her mother and other; Lung disease in her other and other; Osteoporosis in her other; Stroke in her father; Thyroid disease in her son  She  reports that she has quit smoking  She has never used smokeless tobacco  Her alcohol and drug histories are not on file    Current Outpatient Prescriptions   Medication Sig Dispense Refill    gabapentin (NEURONTIN) 100 mg capsule Take 2 capsules by mouth 3 (three) times a day      ipratropium-albuterol (COMBIVENT RESPIMAT) inhaler Inhale every 6 (six) hours      nystatin (MYCOSTATIN) powder Apply topically 3 (three) times a day      omeprazole (PriLOSEC) 40 MG capsule Take 1 capsule by mouth 2 (two) times a day      oxybutynin (DITROPAN-XL) 5 mg 24 hr tablet Take by mouth      pioglitazone (ACTOS) 30 mg tablet Take 1 tablet by mouth daily      Polyethylene Glycol 3350 GRAN Take by mouth daily      aspirin 81 MG tablet Take by mouth      atorvastatin (LIPITOR) 20 mg tablet Take 1 tablet by mouth daily      B-D 3CC LUER-MARISABEL SYR 26GX5/8" 26G X 5/8" 3 ML MISC   0    bumetanide (BUMEX) 1 mg tablet Take by mouth      cholecalciferol (VITAMIN D3) 1,000 units tablet Take by mouth      cyanocobalamin (VITAMIN B-12) 100 mcg tablet Take by mouth      ferrous sulfate 325 (65 Fe) mg tablet Take 1 tablet by mouth 2 (two) times a day      fluticasone-salmeterol (ADVAIR) 100-50 mcg/dose Inhale      folic acid (FOLVITE) 976 mcg tablet Take by mouth      lidocaine (LIDODERM) 5 % Apply 1 patch topically      metoprolol tartrate (LOPRESSOR) 25 mg tablet Take 1 tablet by mouth 2 (two) times a day      omeprazole (PriLOSEC) 40 MG capsule       potassium chloride (KLOR-CON M10) 10 mEq tablet Take 1 tablet by mouth daily       No current facility-administered medications for this visit  No current outpatient prescriptions on file prior to visit  No current facility-administered medications on file prior to visit  She has No Known Allergies       Review of Systems   Musculoskeletal: Positive for arthralgias  All other systems reviewed and are negative  Objective:     Physical Exam   Constitutional: She appears well-developed and well-nourished  Neck: Normal range of motion  Neck supple  Cardiovascular: Normal rate, regular rhythm, normal heart sounds and intact distal pulses  Pulmonary/Chest: Effort normal and breath sounds normal    Abdominal: Soft  Bowel sounds are normal    Musculoskeletal: She exhibits tenderness  Positive Neer's sign  Positive Jara sign  Tenderness at the insertion of supraspinatus  Skin: Skin is warm and dry  Psychiatric: She has a normal mood and affect  Her behavior is normal  Judgment and thought content normal    Nursing note and vitals reviewed

## 2018-04-19 DIAGNOSIS — R52 PAIN: Primary | ICD-10-CM

## 2018-04-19 RX ORDER — LIDOCAINE 50 MG/G
1 PATCH TOPICAL DAILY
Qty: 30 PATCH | Refills: 5 | Status: SHIPPED | OUTPATIENT
Start: 2018-04-19 | End: 2018-06-19 | Stop reason: SDUPTHER

## 2018-05-02 DIAGNOSIS — R35.0 URINARY FREQUENCY: Primary | ICD-10-CM

## 2018-06-19 ENCOUNTER — OFFICE VISIT (OUTPATIENT)
Dept: FAMILY MEDICINE CLINIC | Facility: CLINIC | Age: 78
End: 2018-06-19
Payer: MEDICARE

## 2018-06-19 VITALS
HEART RATE: 70 BPM | OXYGEN SATURATION: 94 % | RESPIRATION RATE: 15 BRPM | WEIGHT: 191.6 LBS | DIASTOLIC BLOOD PRESSURE: 68 MMHG | SYSTOLIC BLOOD PRESSURE: 126 MMHG | BODY MASS INDEX: 35.04 KG/M2

## 2018-06-19 DIAGNOSIS — I10 ESSENTIAL HYPERTENSION: ICD-10-CM

## 2018-06-19 DIAGNOSIS — J44.9 CHRONIC OBSTRUCTIVE PULMONARY DISEASE, UNSPECIFIED COPD TYPE (HCC): ICD-10-CM

## 2018-06-19 DIAGNOSIS — R52 PAIN: ICD-10-CM

## 2018-06-19 DIAGNOSIS — E11.9 TYPE 2 DIABETES MELLITUS WITHOUT COMPLICATION, WITHOUT LONG-TERM CURRENT USE OF INSULIN (HCC): ICD-10-CM

## 2018-06-19 DIAGNOSIS — I50.30 (HFPEF) HEART FAILURE WITH PRESERVED EJECTION FRACTION (HCC): ICD-10-CM

## 2018-06-19 DIAGNOSIS — M79.7 FIBROMYALGIA: ICD-10-CM

## 2018-06-19 DIAGNOSIS — F41.9 ANXIETY: ICD-10-CM

## 2018-06-19 DIAGNOSIS — R31.9 PAINLESS HEMATURIA: Primary | ICD-10-CM

## 2018-06-19 DIAGNOSIS — E53.8 B12 DEFICIENCY: ICD-10-CM

## 2018-06-19 DIAGNOSIS — K21.9 GASTROESOPHAGEAL REFLUX DISEASE, ESOPHAGITIS PRESENCE NOT SPECIFIED: ICD-10-CM

## 2018-06-19 DIAGNOSIS — G62.9 NEUROPATHY: Primary | ICD-10-CM

## 2018-06-19 PROBLEM — E78.49 OTHER HYPERLIPIDEMIA: Status: ACTIVE | Noted: 2018-06-19

## 2018-06-19 PROCEDURE — 99214 OFFICE O/P EST MOD 30 MIN: CPT | Performed by: FAMILY MEDICINE

## 2018-06-19 RX ORDER — LORAZEPAM 1 MG/1
1 TABLET ORAL EVERY 8 HOURS PRN
Qty: 60 TABLET | Refills: 5 | Status: SHIPPED | OUTPATIENT
Start: 2018-06-19 | End: 2018-06-19 | Stop reason: SDUPTHER

## 2018-06-19 RX ORDER — LORAZEPAM 1 MG/1
1 TABLET ORAL EVERY 8 HOURS PRN
Qty: 60 TABLET | Refills: 0 | Status: SHIPPED | OUTPATIENT
Start: 2018-06-19 | End: 2021-12-20

## 2018-06-19 RX ORDER — POTASSIUM CHLORIDE 750 MG/1
10 TABLET, EXTENDED RELEASE ORAL DAILY
Qty: 90 TABLET | Refills: 3 | Status: SHIPPED | OUTPATIENT
Start: 2018-06-19 | End: 2019-09-03 | Stop reason: SDUPTHER

## 2018-06-19 RX ORDER — ATORVASTATIN CALCIUM 20 MG/1
20 TABLET, FILM COATED ORAL DAILY
Qty: 90 TABLET | Refills: 3 | Status: SHIPPED | OUTPATIENT
Start: 2018-06-19 | End: 2018-12-10 | Stop reason: SDUPTHER

## 2018-06-19 RX ORDER — BUMETANIDE 1 MG/1
1 TABLET ORAL DAILY
Qty: 90 TABLET | Refills: 3 | Status: SHIPPED | OUTPATIENT
Start: 2018-06-19 | End: 2019-09-03 | Stop reason: SDUPTHER

## 2018-06-19 RX ORDER — LIDOCAINE 50 MG/G
1 PATCH TOPICAL DAILY
Qty: 90 PATCH | Refills: 3 | Status: SHIPPED | OUTPATIENT
Start: 2018-06-19 | End: 2021-12-20

## 2018-06-19 RX ORDER — PIOGLITAZONEHYDROCHLORIDE 30 MG/1
30 TABLET ORAL DAILY
Qty: 90 TABLET | Refills: 3 | Status: SHIPPED | OUTPATIENT
Start: 2018-06-19 | End: 2018-12-10

## 2018-06-19 RX ORDER — NITROGLYCERIN 0.4 MG/1
0.4 TABLET SUBLINGUAL
Qty: 100 TABLET | Refills: 0 | Status: SHIPPED | OUTPATIENT
Start: 2018-06-19 | End: 2020-04-10 | Stop reason: SDUPTHER

## 2018-06-19 RX ORDER — SYRINGE WITH NEEDLE, 1 ML 25GX5/8"
SYRINGE, EMPTY DISPOSABLE MISCELLANEOUS
Qty: 4 EACH | Refills: 3 | Status: SHIPPED | OUTPATIENT
Start: 2018-06-19 | End: 2018-09-25 | Stop reason: SDUPTHER

## 2018-06-19 RX ORDER — OMEPRAZOLE 40 MG/1
40 CAPSULE, DELAYED RELEASE ORAL DAILY
Qty: 90 CAPSULE | Refills: 3 | Status: SHIPPED | OUTPATIENT
Start: 2018-06-19 | End: 2019-09-03 | Stop reason: SDUPTHER

## 2018-06-19 RX ORDER — GABAPENTIN 100 MG/1
200 CAPSULE ORAL 3 TIMES DAILY
Qty: 540 CAPSULE | Refills: 3 | Status: CANCELLED | OUTPATIENT
Start: 2018-06-19

## 2018-06-19 RX ORDER — GABAPENTIN 100 MG/1
200 CAPSULE ORAL 3 TIMES DAILY
Qty: 270 CAPSULE | Refills: 3 | Status: SHIPPED | OUTPATIENT
Start: 2018-06-19 | End: 2018-06-20 | Stop reason: SDUPTHER

## 2018-06-19 NOTE — PROGRESS NOTES
Assessment/Plan:    No problem-specific Assessment & Plan notes found for this encounter  1  Painless hematuria: We will routinely check UA and CNS  I offered CT scan of the abdomen and pelvis she is unsure that she would be able to tolerate the study  She states that she is not interested in surgical options any way  We will check urine cytology  2   Type 2 diabetes mellitus: A1c is at goal   Continue current regimen  Watch fluid status while on active this  Her A1c goal is between 7 and 8 percent  We will recheck her A1c in August     3  Myalgia: Consider holding her statin medication  In the meantime will check a sed rate, CRP, and work her up for rheumatoid arthritis  4  Hyperlipidemia  Continue her current statin medication  Although this is not "high-intensity" statin therapy, her LDL is at goal     5  Hypertension: BP is at goal   Continue current regimen  6  B12 deficiency:  Continue subcu B12 supplementation  Diagnoses and all orders for this visit:    Painless hematuria  -     UA (URINE) with reflex to Microscopic  -     Microalbumin / creatinine urine ratio  -     Cytology, urine; Future  -     CT abdomen pelvis w wo contrast; Future    Type 2 diabetes mellitus without complication, without long-term current use of insulin ([facility])  -     Lipid panel; Future  -     Comprehensive metabolic panel; Future  -     Lipid panel; Future  -     HEMOGLOBIN A1C W/ EAG ESTIMATION; Future  -     gabapentin (NEURONTIN) 100 mg capsule; Take 2 capsules (200 mg total) by mouth 3 (three) times a day  -     pioglitazone (ACTOS) 30 mg tablet; Take 1 tablet (30 mg total) by mouth daily    Chronic obstructive pulmonary disease, unspecified COPD type ([facility] [address] )    (HFpEF) heart failure with preserved ejection fraction (HCC)  -     bumetanide (BUMEX) 1 mg tablet; Take 1 tablet (1 mg total) by mouth daily  -     potassium chloride (KLOR-CON M10) 10 mEq tablet;  Take 1 tablet (10 mEq total) by mouth daily  - nitroglycerin (NITROSTAT) 0 4 mg SL tablet; Place 1 tablet (0 4 mg total) under the tongue every 5 (five) minutes as needed for chest pain    Essential hypertension  -     atorvastatin (LIPITOR) 20 mg tablet; Take 1 tablet (20 mg total) by mouth daily  -     metoprolol tartrate (LOPRESSOR) 25 mg tablet; Take 1 tablet (25 mg total) by mouth 2 (two) times a day    B12 deficiency  -     B-D 3CC LUER-MARISABEL SYR 26GX5/8" 26G X 5/8" 3 ML MISC; Inject under the skin every 30 (thirty) days  -     Cyanocobalamin 1000 MCG/ML KIT; Inject 1 mL (1,000 mcg total) as directed every 30 (thirty) days    Pain  -     lidocaine (LIDODERM) 5 %; Place 1 patch on the skin daily    Gastroesophageal reflux disease, esophagitis presence not specified  -     omeprazole (PriLOSEC) 40 MG capsule; Take 1 capsule (40 mg total) by mouth daily    Anxiety  -     LORazepam (ATIVAN) 1 mg tablet; Take 1 tablet (1 mg total) by mouth every 8 (eight) hours as needed for anxiety             Subjective:      Patient ID: Lee Martinez is a 65 y o  female  She was recently treated for hemorrhagic cystitis  She was treated with Cipro with prompt resolution  She has type 2 DM  Her blood sugars are well controlled  Her last A1c was 5 5%  She has no hypoglycemia  She has no side effects  Her BP is at goal on her current regimen  She has no CP or SOB  She has no headache of vision changes  She has increasing peripheral edema  She alternates between 0 5 and 1 mg of Bumex daily  She has a h/o CHF  She no STEVENS, PND, or orthopnea  Her weight is stable  Her COPD is stable  She is doing well on her current regimen  She has no increased wheezing or SOB  She has no sputum production  She is not using Advair currently, but she has more issues in the warm weather  The following portions of the patient's history were reviewed and updated as appropriate:   She  has a past medical history of Delirium (8/19/2017)    She   Patient Active Problem List    Diagnosis Date Noted    Painless hematuria 06/19/2018    Other hyperlipidemia 06/19/2018    Tendinitis of right rotator cuff 02/02/2018    Anemia, pernicious 10/27/2017    Abnormal chest xray 10/03/2017    Diarrhea 10/03/2017    Skin rash 09/27/2017    Sciatica of right side 09/22/2017    Urinary incontinence 09/22/2017    Gastrointestinal hemorrhage associated with duodenal ulcer 08/24/2017    Delirium 08/19/2017    Hypernatremia 08/19/2017    Severe malnutrition ([facility] [address] ) 08/16/2017    Acute blood loss anemia 08/15/2017    Acute respiratory failure ([address] ) 08/15/2017    ANGEL (acute kidney injury) ([facility] [address] ) 08/15/2017    Shock ([facility] [address] ) 08/15/2017    (HFpEF) heart failure with preserved ejection fraction ([facility] [address] ) 08/02/2017    Central sleep apnea 08/02/2017    COPD (chronic obstructive pulmonary disease) ([facility] [address] ) 08/02/2017    [facility] 08/02/2017    Acute respiratory failure with hypoxia ([facility] [address] ) 07/23/2017    CAD (coronary artery disease) 07/23/2017    Heart failure ([facility] [address] ) 07/23/2017    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    Acute lower UTI 07/21/2017    Mental disorder 06/20/2017    Fibromyalgia 06/20/2017    Hypertension 06/20/2017    Osteoporosis 06/20/2017    Shortness of breath 06/20/2017    Type 2 diabetes mellitus [facility] 06/20/2017     She  has a past surgical history that includes Cardiac surgery  Her family history includes Cancer in her daughter and other; Diabetes in her mother and other; Heart disease in her brother; Hypertension in her mother and other; Lung disease in her other and other; Osteoporosis in her other; Stroke in her father; Thyroid disease in her son  She  reports that she has quit smoking  She has never used smokeless tobacco  She reports that she does not drink alcohol or use drugs    Current Outpatient Prescriptions   Medication Sig Dispense Refill    aspirin 81 MG tablet Take by mouth      atorvastatin (LIPITOR) 20 mg tablet Take 1 tablet (20 mg total) by mouth daily 90 tablet 3    B-D 3CC LUER-MARISABEL SYR 26GX5/8" 26G X 5/8" 3 ML MISC Inject under the skin every 30 (thirty) days 4 each 3    bumetanide (BUMEX) 1 mg tablet Take 1 tablet (1 mg total) by mouth daily 90 tablet 3    cholecalciferol (VITAMIN D3) 1,000 units tablet Take 2,000 Units by mouth daily        cyanocobalamin (VITAMIN B-12) 100 mcg tablet Take by mouth      ferrous sulfate 325 (65 Fe) mg tablet Take 1 tablet by mouth daily with breakfast        folic acid (FOLVITE) 412 mcg tablet Take by mouth      gabapentin (NEURONTIN) 100 mg capsule Take 2 capsules (200 mg total) by mouth 3 (three) times a day 270 capsule 3    lidocaine (LIDODERM) 5 % Place 1 patch on the skin daily 90 patch 3    metoprolol tartrate (LOPRESSOR) 25 mg tablet Take 1 tablet (25 mg total) by mouth 2 (two) times a day 180 tablet 3    nystatin (MYCOSTATIN) powder Apply topically 3 (three) times a day      omeprazole (PriLOSEC) 40 MG capsule Take 1 capsule (40 mg total) by mouth daily 90 capsule 3    oxybutynin (DITROPAN-XL) 5 mg 24 hr tablet Take by mouth      pioglitazone (ACTOS) 30 mg tablet Take 1 tablet (30 mg total) by mouth daily 90 tablet 3    potassium chloride (KLOR-CON M10) 10 mEq tablet Take 1 tablet (10 mEq total) by mouth daily 90 tablet 3    Cyanocobalamin 1000 MCG/ML KIT Inject 1 mL (1,000 mcg total) as directed every 30 (thirty) days 10 mL 3    ipratropium-albuterol (COMBIVENT RESPIMAT) inhaler Inhale every 6 (six) hours      LORazepam (ATIVAN) 1 mg tablet Take 1 tablet (1 mg total) by mouth every 8 (eight) hours as needed for anxiety 60 tablet 5    nitroglycerin (NITROSTAT) 0 4 mg SL tablet Place 1 tablet (0 4 mg total) under the tongue every 5 (five) minutes as needed for chest pain 100 tablet 0     No current facility-administered medications for this visit        Current Outpatient Prescriptions on File Prior to Visit   Medication Sig    aspirin 81 MG tablet Take by mouth    cholecalciferol (VITAMIN D3) 1,000 units tablet Take 2,000 Units by mouth daily      cyanocobalamin (VITAMIN B-12) 100 mcg tablet Take by mouth    ferrous sulfate 325 (65 Fe) mg tablet Take 1 tablet by mouth daily with breakfast      folic acid (FOLVITE) 316 mcg tablet Take by mouth    nystatin (MYCOSTATIN) powder Apply topically 3 (three) times a day    oxybutynin (DITROPAN-XL) 5 mg 24 hr tablet Take by mouth    [DISCONTINUED] atorvastatin (LIPITOR) 20 mg tablet Take 1 tablet by mouth daily    [DISCONTINUED] B-D 3CC LUER-MARISABEL SYR 26GX5/8" 26G X 5/8" 3 ML MISC     [DISCONTINUED] bumetanide (BUMEX) 1 mg tablet Take 0 5 mg by mouth every other day      [DISCONTINUED] gabapentin (NEURONTIN) 100 mg capsule Take 2 capsules by mouth 3 (three) times a day    [DISCONTINUED] lidocaine (LIDODERM) 5 % Place 1 patch on the skin daily    [DISCONTINUED] metoprolol tartrate (LOPRESSOR) 25 mg tablet Take 1 tablet by mouth 2 (two) times a day    [DISCONTINUED] omeprazole (PriLOSEC) 40 MG capsule Take 1 capsule by mouth daily      [DISCONTINUED] pioglitazone (ACTOS) 30 mg tablet Take 1 tablet by mouth daily    [DISCONTINUED] potassium chloride (KLOR-CON M10) 10 mEq tablet Take 1 tablet by mouth daily    ipratropium-albuterol (COMBIVENT RESPIMAT) inhaler Inhale every 6 (six) hours    [DISCONTINUED] fluticasone-salmeterol (ADVAIR) 100-50 mcg/dose Inhale    [DISCONTINUED] omeprazole (PriLOSEC) 40 MG capsule     [DISCONTINUED] Polyethylene Glycol 3350 GRAN Take by mouth daily     No current facility-administered medications on file prior to visit  She has No Known Allergies       Review of Systems   All other systems reviewed and are negative  Objective:      /68 (BP Location: Right arm, Patient Position: Sitting, Cuff Size: Large)   Pulse 70   Resp 15   Wt 86 9 kg (191 lb 9 6 oz)   SpO2 94%   BMI 35 04 kg/m²          Physical Exam   Constitutional: She is oriented to person, place, and time   She appears well-developed and well-nourished  Neck: Normal range of motion  Neck supple  Cardiovascular: Normal rate, regular rhythm, normal heart sounds and intact distal pulses  Pulmonary/Chest: Effort normal and breath sounds normal    Abdominal: Soft  Bowel sounds are normal    Musculoskeletal: Normal range of motion  Neurological: She is alert and oriented to person, place, and time  She has normal reflexes  Skin: Skin is warm and dry  Psychiatric: She has a normal mood and affect  Her behavior is normal  Judgment and thought content normal    Nursing note and vitals reviewed

## 2018-06-20 DIAGNOSIS — E11.9 TYPE 2 DIABETES MELLITUS WITHOUT COMPLICATION, WITHOUT LONG-TERM CURRENT USE OF INSULIN (HCC): ICD-10-CM

## 2018-06-20 RX ORDER — GABAPENTIN 100 MG/1
200 CAPSULE ORAL 3 TIMES DAILY
Qty: 540 CAPSULE | Refills: 3 | Status: SHIPPED | OUTPATIENT
Start: 2018-06-20 | End: 2020-06-29

## 2018-09-25 ENCOUNTER — OFFICE VISIT (OUTPATIENT)
Dept: FAMILY MEDICINE CLINIC | Facility: CLINIC | Age: 78
End: 2018-09-25
Payer: MEDICARE

## 2018-09-25 VITALS
SYSTOLIC BLOOD PRESSURE: 124 MMHG | DIASTOLIC BLOOD PRESSURE: 64 MMHG | OXYGEN SATURATION: 92 % | WEIGHT: 190 LBS | HEART RATE: 54 BPM | RESPIRATION RATE: 14 BRPM | BODY MASS INDEX: 34.96 KG/M2 | HEIGHT: 62 IN

## 2018-09-25 DIAGNOSIS — I27.20 PULMONARY HYPERTENSION (HCC): ICD-10-CM

## 2018-09-25 DIAGNOSIS — M79.7 FIBROMYALGIA: ICD-10-CM

## 2018-09-25 DIAGNOSIS — J44.9 CHRONIC OBSTRUCTIVE PULMONARY DISEASE, UNSPECIFIED COPD TYPE (HCC): ICD-10-CM

## 2018-09-25 DIAGNOSIS — E11.9 TYPE 2 DIABETES MELLITUS WITHOUT COMPLICATION, WITHOUT LONG-TERM CURRENT USE OF INSULIN (HCC): Primary | ICD-10-CM

## 2018-09-25 DIAGNOSIS — E53.8 B12 DEFICIENCY: ICD-10-CM

## 2018-09-25 DIAGNOSIS — N39.0 URINARY TRACT INFECTION WITHOUT HEMATURIA, SITE UNSPECIFIED: ICD-10-CM

## 2018-09-25 DIAGNOSIS — I50.30 (HFPEF) HEART FAILURE WITH PRESERVED EJECTION FRACTION (HCC): ICD-10-CM

## 2018-09-25 LAB
BACTERIA UR QL AUTO: ABNORMAL /HPF
BILIRUB UR QL STRIP: ABNORMAL
CLARITY UR: ABNORMAL
COLOR UR: ABNORMAL
CREAT UR-MCNC: 249 MG/DL
GLUCOSE UR STRIP-MCNC: NEGATIVE MG/DL
HGB UR QL STRIP.AUTO: ABNORMAL
HYALINE CASTS #/AREA URNS LPF: ABNORMAL /LPF
KETONES UR STRIP-MCNC: NEGATIVE MG/DL
LEUKOCYTE ESTERASE UR QL STRIP: ABNORMAL
MICROALBUMIN UR-MCNC: 113 MG/L (ref 0–20)
MICROALBUMIN/CREAT 24H UR: 45 MG/G CREATININE (ref 0–30)
NITRITE UR QL STRIP: NEGATIVE
NON-SQ EPI CELLS URNS QL MICRO: ABNORMAL /HPF
PH UR STRIP.AUTO: 5.5 [PH] (ref 4.5–8)
PROT UR STRIP-MCNC: ABNORMAL MG/DL
RBC #/AREA URNS AUTO: ABNORMAL /HPF
SP GR UR STRIP.AUTO: 1.02 (ref 1–1.03)
UROBILINOGEN UR QL STRIP.AUTO: 0.2 E.U./DL
WBC #/AREA URNS AUTO: ABNORMAL /HPF

## 2018-09-25 PROCEDURE — 87086 URINE CULTURE/COLONY COUNT: CPT | Performed by: FAMILY MEDICINE

## 2018-09-25 PROCEDURE — 87186 SC STD MICRODIL/AGAR DIL: CPT | Performed by: FAMILY MEDICINE

## 2018-09-25 PROCEDURE — 81001 URINALYSIS AUTO W/SCOPE: CPT | Performed by: FAMILY MEDICINE

## 2018-09-25 PROCEDURE — 99214 OFFICE O/P EST MOD 30 MIN: CPT | Performed by: FAMILY MEDICINE

## 2018-09-25 PROCEDURE — 82570 ASSAY OF URINE CREATININE: CPT | Performed by: FAMILY MEDICINE

## 2018-09-25 PROCEDURE — 82043 UR ALBUMIN QUANTITATIVE: CPT | Performed by: FAMILY MEDICINE

## 2018-09-25 PROCEDURE — 87077 CULTURE AEROBIC IDENTIFY: CPT | Performed by: FAMILY MEDICINE

## 2018-09-25 RX ORDER — SYRINGE WITH NEEDLE, 1 ML 25GX5/8"
SYRINGE, EMPTY DISPOSABLE MISCELLANEOUS
Qty: 4 EACH | Refills: 0 | Status: SHIPPED | OUTPATIENT
Start: 2018-09-25 | End: 2021-12-20

## 2018-09-25 NOTE — PATIENT INSTRUCTIONS
Diabetes in the Older Adult   WHAT YOU NEED TO KNOW:   Older adults with diabetes are at risk for heart disease, stroke, kidney disease, blindness, and nerve damage  You may also be at risk for any of the following:  · Poor nutrition or low blood sugar levels    · Confusion or problems with memory, attention, or learning new things    · Trouble controlling urination or frequent urinary tract infections    · Trouble with coordination or balance    · Falls and injuries    · Pain    · Depression    · Open sores on your legs or feet  DISCHARGE INSTRUCTIONS:   Call 911 for any of the following:   · You have any of the following signs of a stroke:      ¨ Numbness or drooping on one side of your face     ¨ Weakness in an arm or leg    ¨ Confusion or difficulty speaking    ¨ Dizziness, a severe headache, or vision loss    · You have any of the following signs of a heart attack:      ¨ Squeezing, pressure, or pain in your chest that lasts longer than 5 minutes or returns    ¨ Discomfort or pain in your back, neck, jaw, stomach, or arm     ¨ Trouble breathing    ¨ Nausea or vomiting    ¨ Lightheadedness or a sudden cold sweat, especially with chest pain or trouble breathing  Return to the emergency department if:   · You have severe abdominal pain, or the pain spreads to your back  You may also be vomiting  · You have trouble staying awake or focusing  · You are shaking or sweating  · You have blurred or double vision  · Your breath has a fruity, sweet smell  · Your breathing is deep and labored, or rapid and shallow  · Your heartbeat is fast and weak  · You fall and get hurt  Contact your healthcare provider if:   · You are vomiting or have diarrhea  · You have an upset stomach and cannot eat the foods on your meal plan  · You feel weak or more tired than usual      · You feel dizzy, have headaches, or are easily irritated  · Your skin is red, warm, dry, or swollen       · You have a wound that does not heal      · You have numbness in your arms or legs  · You have trouble coping with your illness, or you feel anxious or depressed  · You have problems with your memory  · You have changes in your vision  · You have questions or concerns about your condition or care  Medicines  may be given to decrease the amount of sugar in your blood  You may also need medicine to lower your blood pressure or cholesterol, or medicine to prevent blood clots  Manage the ABCs and prevent problems caused by diabetes:   · Check your blood sugar levels as directed  Your healthcare provider will tell you when and how often to check during the day  Your healthcare provider will also tell you what your blood sugar levels should be before and after a meal  You may need to check for ketones in your urine or blood if your level is higher than directed  Write down your results and show them to your healthcare provider  Your provider may use the results to make changes to your medicine, food, or exercise schedules  Ask your healthcare provider for more information about how to treat a high or low blood sugar level  · Follow your meal plan as directed  A dietitian will help you make a meal plan to keep your blood sugar level steady and make sure you get enough nutrition  Do not skip meals  Your blood sugar level may drop too low if you have taken diabetes medicine and do not eat  Ask your healthcare provider about programs in your community that can deliver the meals to your home  · Try to be active for 30 to 60 minutes most days of the week  Exercise can help keep your blood sugar level steady, decrease your risk of heart disease, and help you lose weight  It can also help improve your balance and decrease your risk for falls  Work with your healthcare provider to create an exercise plan  Ask a family member or friend to exercise with you   Start slow and exercise for 5 to 10 minutes at a time  Examples of activities include walking or swimming  Include muscle strengthening activities 2 to 3 days each week  Include balance training 2 to 3 times each week  Activities that help increase balance include yoga and marty chi      · Maintain a healthy weight  Ask your healthcare provider how much you should weigh  A healthy weight can help you control your diabetes and prevent heart disease  Ask your provider to help you create a weight loss plan if you are overweight  Together you can set manageable weight loss goals  · Do not smoke  Ask your healthcare provider for information if you currently smoke and need help to quit  Do not use e-cigarettes or smokeless tobacco in place of cigarettes or to help you quit  They still contain nicotine  · Manage stress  Stress may increase your blood sugar level  Deep breathing, muscle relaxation, and music may help you relax  Ask your healthcare provider for more information about these practices  Other ways to manage your diabetes:   · Check your feet every day for sores  Look at your whole foot, including the bottom, and between and under your toes  Check for wounds, corns, and calluses  Use a mirror to see the bottom of your feet  The skin on your feet may be shiny, tight, dry, or darker than normal  Your feet may also be cold and pale  Feel your feet by running your hands along the tops, bottoms, sides, and between your toes  Redness, swelling, and warmth are signs of blood flow problems that can lead to a foot ulcer  Do not try to remove corns or calluses yourself  · Wear medical alert identification  Wear medical alert jewelry or carry a card that says you have diabetes  Ask your healthcare provider where to get these items  · Ask about vaccines  You have a higher risk for serious illness if you get the flu, pneumonia, or hepatitis   Ask your healthcare provider if you should get a flu, pneumonia, shingles, or hepatitis B vaccine, and when to get the vaccine  · Keep all appointments  You may need to return to have your A1c checked every 3 months  You will need to return at least once each year to have your feet checked  You will need an eye exam once a year to check for retinopathy  You will also need urine tests every year to check for kidney problems  You may need tests to monitor for heart disease  Write down your questions so you remember to ask them during your visits  · Get help from family and friends  You may need help checking your blood sugar level, giving insulin injections, or preparing your meals  Ask your family and friends to help you with these tasks  Talk to your healthcare provider if you do not have someone at home to help you  A healthcare provider can come to your home to help you with these tasks  Follow up with your healthcare provider as directed: You may need to return to have your A1c checked every 3 months  You will need to return at least once each year to have your feet checked  You will need an eye exam once a year to check for retinopathy  You will also need urine tests every year to check for kidney problems  You may need tests to monitor for heart disease  Write down your questions so you remember to ask them during your visits  © 2017 [employer] Information is for End User's use only and may not be sold, redistributed or otherwise used for commercial purposes  All illustrations and images included in CareNotes® are the copyrighted property of [employer]  or [employer]  The above information is an  only  It is not intended as medical advice for individual conditions or treatments  Talk to your doctor, nurse or pharmacist before following any medical regimen to see if it is safe and effective for you

## 2018-09-25 NOTE — PROGRESS NOTES
Assessment/Plan:    No problem-specific Assessment & Plan notes found for this encounter  Diagnoses and all orders for this visit:    Type 2 diabetes mellitus without complication, without long-term current use of insulin ([facility] [address] )  -     Lipid panel; Future  -     Comprehensive metabolic panel; Future  -     Hemoglobin A1c (w/out EAG); Future  -     Microalbumin / creatinine urine ratio  -     Sedimentation rate, automated; Future  -     CBC and differential; Future    Chronic obstructive pulmonary disease, unspecified COPD type ([facility] [address] )  -     Lipid panel; Future  -     Comprehensive metabolic panel; Future  -     Hemoglobin A1c (w/out EAG); Future  -     Microalbumin / creatinine urine ratio  -     Sedimentation rate, automated; Future  -     CBC and differential; Future    Pulmonary hypertension (HCC)    (HFpEF) heart failure with preserved ejection fraction (HCC)    Fibromyalgia  -     Sedimentation rate, automated; Future  -     CBC and differential; Future          Subjective:      Patient ID: Beatriz Greene is a 77 y o  female  Her DM is under good control  She has no hypoglycemia  She has no side effects from her current regimen  Her COPD is stable  She has no cough or sputum production  She has no STEVENS, wheeze, or SOB  Her BP is at goal   She has no CP or SOB  She has CHF  She appears euvolemic in the office today  She has no PND or orthopnea  She c/o myalgia, especially of the upper arms  The following portions of the patient's history were reviewed and updated as appropriate:   She  has a past medical history of Delirium (8/19/2017)    She   Patient Active Problem List    Diagnosis Date Noted    Painless hematuria 06/19/2018    Other hyperlipidemia 06/19/2018    Tendinitis of right rotator cuff 02/02/2018    Anemia, pernicious 10/27/2017    Abnormal chest xray 10/03/2017    Diarrhea 10/03/2017    Skin rash 09/27/2017    Sciatica of right side 09/22/2017    Urinary incontinence 09/22/2017    Gastrointestinal hemorrhage associated with duodenal ulcer 08/24/2017    Delirium 08/19/2017    Hypernatremia 08/19/2017    Severe malnutrition ([facility] ) 08/16/2017    Acute blood loss anemia 08/15/2017    Acute respiratory failure ([facility] ) 08/15/2017    ANGEL (acute kidney injury) ([facility] ) 08/15/2017    Shock ([place] [address] ) 08/15/2017    (HFpEF) heart failure with preserved ejection fraction ([place] [address] ) 08/02/2017    Central sleep apnea 08/02/2017    COPD (chronic obstructive pulmonary disease) ([facility] ) 08/02/2017    Pulmonary hypertension ([place] [address] ) 08/02/2017    Acute respiratory failure with hypoxia ([place] [address] ) 07/23/2017    CAD (coronary artery disease) 07/23/2017    Heart failure ([place] [address] ) 07/23/2017    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    Acute lower UTI 07/21/2017    Mental disorder 06/20/2017    Fibromyalgia 06/20/2017    Hypertension 06/20/2017    Osteoporosis 06/20/2017    Shortness of breath 06/20/2017    Type 2 diabetes mellitus ([place] [address] ) 06/20/2017     She  has a past surgical history that includes Cardiac surgery  Her family history includes Cancer in her daughter and other; Diabetes in her mother and other; Heart disease in her brother; Hypertension in her mother and other; Lung disease in her other and other; Osteoporosis in her other; Stroke in her father; Thyroid disease in her son  She  reports that she has quit smoking  She has never used smokeless tobacco  She reports that she does not drink alcohol or use drugs    Current Outpatient Prescriptions   Medication Sig Dispense Refill    aspirin 81 MG tablet Take by mouth      atorvastatin (LIPITOR) 20 mg tablet Take 1 tablet (20 mg total) by mouth daily 90 tablet 3    B-D 3CC LUER-MARISABEL SYR 26GX5/8" 26G X 5/8" 3 ML MISC Inject under the skin every 30 (thirty) days 4 each 3    bumetanide (BUMEX) 1 mg tablet Take 1 tablet (1 mg total) by mouth daily 90 tablet 3    cholecalciferol (VITAMIN D3) 1,000 units tablet Take 2,000 Units by mouth daily        cyanocobalamin (VITAMIN B-12) 100 mcg tablet Take by mouth      Cyanocobalamin 1000 MCG/ML KIT Inject 1 mL (1,000 mcg total) as directed every 30 (thirty) days 10 mL 3    ferrous sulfate 325 (65 Fe) mg tablet Take 1 tablet by mouth daily with breakfast        folic acid (FOLVITE) 987 mcg tablet Take by mouth      gabapentin (NEURONTIN) 100 mg capsule Take 2 capsules (200 mg total) by mouth 3 (three) times a day 540 capsule 3    ipratropium-albuterol (COMBIVENT RESPIMAT) inhaler Inhale every 6 (six) hours      lidocaine (LIDODERM) 5 % Place 1 patch on the skin daily 90 patch 3    LORazepam (ATIVAN) 1 mg tablet Take 1 tablet (1 mg total) by mouth every 8 (eight) hours as needed for anxiety 60 tablet 0    metoprolol tartrate (LOPRESSOR) 25 mg tablet Take 1 tablet (25 mg total) by mouth 2 (two) times a day 180 tablet 3    nitroglycerin (NITROSTAT) 0 4 mg SL tablet Place 1 tablet (0 4 mg total) under the tongue every 5 (five) minutes as needed for chest pain 100 tablet 0    nystatin (MYCOSTATIN) powder Apply topically 3 (three) times a day      omeprazole (PriLOSEC) 40 MG capsule Take 1 capsule (40 mg total) by mouth daily 90 capsule 3    oxybutynin (DITROPAN-XL) 5 mg 24 hr tablet Take by mouth      pioglitazone (ACTOS) 30 mg tablet Take 1 tablet (30 mg total) by mouth daily 90 tablet 3    potassium chloride (KLOR-CON M10) 10 mEq tablet Take 1 tablet (10 mEq total) by mouth daily 90 tablet 3     No current facility-administered medications for this visit        Current Outpatient Prescriptions on File Prior to Visit   Medication Sig    aspirin 81 MG tablet Take by mouth    atorvastatin (LIPITOR) 20 mg tablet Take 1 tablet (20 mg total) by mouth daily    B-D 3CC LUER-MARISABEL SYR 26GX5/8" 26G X 5/8" 3 ML MISC Inject under the skin every 30 (thirty) days    bumetanide (BUMEX) 1 mg tablet Take 1 tablet (1 mg total) by mouth daily    cholecalciferol (VITAMIN D3) 1,000 units tablet Take 2,000 Units by mouth daily  cyanocobalamin (VITAMIN B-12) 100 mcg tablet Take by mouth    Cyanocobalamin 1000 MCG/ML KIT Inject 1 mL (1,000 mcg total) as directed every 30 (thirty) days    ferrous sulfate 325 (65 Fe) mg tablet Take 1 tablet by mouth daily with breakfast      folic acid (FOLVITE) 825 mcg tablet Take by mouth    gabapentin (NEURONTIN) 100 mg capsule Take 2 capsules (200 mg total) by mouth 3 (three) times a day    ipratropium-albuterol (COMBIVENT RESPIMAT) inhaler Inhale every 6 (six) hours    lidocaine (LIDODERM) 5 % Place 1 patch on the skin daily    LORazepam (ATIVAN) 1 mg tablet Take 1 tablet (1 mg total) by mouth every 8 (eight) hours as needed for anxiety    metoprolol tartrate (LOPRESSOR) 25 mg tablet Take 1 tablet (25 mg total) by mouth 2 (two) times a day    nitroglycerin (NITROSTAT) 0 4 mg SL tablet Place 1 tablet (0 4 mg total) under the tongue every 5 (five) minutes as needed for chest pain    nystatin (MYCOSTATIN) powder Apply topically 3 (three) times a day    omeprazole (PriLOSEC) 40 MG capsule Take 1 capsule (40 mg total) by mouth daily    oxybutynin (DITROPAN-XL) 5 mg 24 hr tablet Take by mouth    pioglitazone (ACTOS) 30 mg tablet Take 1 tablet (30 mg total) by mouth daily    potassium chloride (KLOR-CON M10) 10 mEq tablet Take 1 tablet (10 mEq total) by mouth daily     No current facility-administered medications on file prior to visit  She has No Known Allergies       Review of Systems   All other systems reviewed and are negative  Objective:      /64 (BP Location: Right arm, Patient Position: Sitting)   Pulse (!) 54   Resp 14   Ht 5' 2" (1 575 m)   Wt 86 2 kg (190 lb)   SpO2 92%   BMI 34 75 kg/m²          Physical Exam   Constitutional: She is oriented to person, place, and time  She appears well-developed and well-nourished  Neck: Normal range of motion  Neck supple  Cardiovascular: Normal rate, regular rhythm, normal heart sounds and intact distal pulses  Pulmonary/Chest: Effort normal and breath sounds normal    Abdominal: Soft  Bowel sounds are normal    Musculoskeletal: Normal range of motion  Neurological: She is alert and oriented to person, place, and time  She has normal reflexes  Skin: Skin is warm and dry  Psychiatric: She has a normal mood and affect  Her behavior is normal  Judgment and thought content normal    Nursing note and vitals reviewed

## 2018-09-26 RX ORDER — SULFAMETHOXAZOLE AND TRIMETHOPRIM 800; 160 MG/1; MG/1
1 TABLET ORAL EVERY 12 HOURS SCHEDULED
Qty: 14 TABLET | Refills: 0 | Status: SHIPPED | OUTPATIENT
Start: 2018-09-26 | End: 2018-10-03

## 2018-09-27 LAB — BACTERIA UR CULT: ABNORMAL

## 2018-10-02 LAB — HBA1C MFR BLD HPLC: 5.6 %

## 2018-10-13 DIAGNOSIS — N32.81 OAB (OVERACTIVE BLADDER): Primary | ICD-10-CM

## 2018-10-15 RX ORDER — OXYBUTYNIN CHLORIDE 5 MG/1
TABLET, EXTENDED RELEASE ORAL
Qty: 90 TABLET | Refills: 3 | Status: SHIPPED | OUTPATIENT
Start: 2018-10-15 | End: 2021-12-20

## 2018-11-14 LAB
LEFT EYE DIABETIC RETINOPATHY: NORMAL
RIGHT EYE DIABETIC RETINOPATHY: NORMAL
SEVERITY (EYE EXAM): NORMAL

## 2018-11-26 LAB — HBA1C MFR BLD HPLC: 5.6 %

## 2018-12-10 ENCOUNTER — OFFICE VISIT (OUTPATIENT)
Dept: FAMILY MEDICINE CLINIC | Facility: CLINIC | Age: 78
End: 2018-12-10
Payer: MEDICARE

## 2018-12-10 VITALS
HEIGHT: 62 IN | DIASTOLIC BLOOD PRESSURE: 80 MMHG | SYSTOLIC BLOOD PRESSURE: 132 MMHG | OXYGEN SATURATION: 91 % | HEART RATE: 78 BPM | BODY MASS INDEX: 35.66 KG/M2 | RESPIRATION RATE: 16 BRPM | WEIGHT: 193.8 LBS

## 2018-12-10 DIAGNOSIS — E11.9 TYPE 2 DIABETES MELLITUS WITHOUT COMPLICATION, WITHOUT LONG-TERM CURRENT USE OF INSULIN (HCC): Primary | ICD-10-CM

## 2018-12-10 DIAGNOSIS — I10 ESSENTIAL HYPERTENSION: ICD-10-CM

## 2018-12-10 DIAGNOSIS — N32.81 OAB (OVERACTIVE BLADDER): ICD-10-CM

## 2018-12-10 DIAGNOSIS — E78.49 OTHER HYPERLIPIDEMIA: ICD-10-CM

## 2018-12-10 DIAGNOSIS — Z95.1 S/P CABG X 2: ICD-10-CM

## 2018-12-10 PROCEDURE — 99214 OFFICE O/P EST MOD 30 MIN: CPT | Performed by: FAMILY MEDICINE

## 2018-12-10 RX ORDER — INCONTINENCE PAD,LINER,DISP
EACH MISCELLANEOUS 3 TIMES DAILY
Qty: 270 EACH | Refills: 3 | Status: SHIPPED | OUTPATIENT
Start: 2018-12-10

## 2018-12-10 RX ORDER — PIOGLITAZONEHYDROCHLORIDE 15 MG/1
15 TABLET ORAL DAILY
Qty: 90 TABLET | Refills: 3 | Status: SHIPPED | OUTPATIENT
Start: 2018-12-10 | End: 2018-12-21 | Stop reason: SDUPTHER

## 2018-12-10 RX ORDER — ATORVASTATIN CALCIUM 20 MG/1
40 TABLET, FILM COATED ORAL DAILY
Qty: 90 TABLET | Refills: 3 | Status: SHIPPED | OUTPATIENT
Start: 2018-12-10 | End: 2018-12-21 | Stop reason: SDUPTHER

## 2018-12-10 RX ORDER — OXYBUTYNIN CHLORIDE 10 MG/1
10 TABLET, EXTENDED RELEASE ORAL
Qty: 90 TABLET | Refills: 3 | Status: SHIPPED | OUTPATIENT
Start: 2018-12-10 | End: 2018-12-21 | Stop reason: SDUPTHER

## 2018-12-17 LAB
LEFT EYE DIABETIC RETINOPATHY: NORMAL
RIGHT EYE DIABETIC RETINOPATHY: NORMAL

## 2018-12-21 DIAGNOSIS — N32.81 OAB (OVERACTIVE BLADDER): ICD-10-CM

## 2018-12-21 DIAGNOSIS — I10 ESSENTIAL HYPERTENSION: ICD-10-CM

## 2018-12-21 DIAGNOSIS — E11.9 TYPE 2 DIABETES MELLITUS WITHOUT COMPLICATION, WITHOUT LONG-TERM CURRENT USE OF INSULIN (HCC): ICD-10-CM

## 2018-12-21 RX ORDER — OXYBUTYNIN CHLORIDE 10 MG/1
10 TABLET, EXTENDED RELEASE ORAL
Qty: 90 TABLET | Refills: 3 | Status: SHIPPED | OUTPATIENT
Start: 2018-12-21 | End: 2020-03-16

## 2018-12-21 RX ORDER — ATORVASTATIN CALCIUM 20 MG/1
40 TABLET, FILM COATED ORAL DAILY
Qty: 180 TABLET | Refills: 3 | Status: SHIPPED | OUTPATIENT
Start: 2018-12-21 | End: 2020-03-16

## 2018-12-21 RX ORDER — PIOGLITAZONEHYDROCHLORIDE 15 MG/1
15 TABLET ORAL DAILY
Qty: 90 TABLET | Refills: 3 | Status: SHIPPED | OUTPATIENT
Start: 2018-12-21 | End: 2019-07-09 | Stop reason: SDUPTHER

## 2019-01-28 ENCOUNTER — TELEPHONE (OUTPATIENT)
Dept: FAMILY MEDICINE CLINIC | Facility: CLINIC | Age: 79
End: 2019-01-28

## 2019-01-28 DIAGNOSIS — N30.00 ACUTE CYSTITIS WITHOUT HEMATURIA: Primary | ICD-10-CM

## 2019-01-28 RX ORDER — SULFAMETHOXAZOLE AND TRIMETHOPRIM 800; 160 MG/1; MG/1
1 TABLET ORAL 2 TIMES DAILY
Qty: 6 TABLET | Refills: 0 | Status: SHIPPED | OUTPATIENT
Start: 2019-01-28 | End: 2019-01-31

## 2019-03-06 DIAGNOSIS — E53.8 B12 DEFICIENCY: ICD-10-CM

## 2019-06-07 RX ORDER — POTASSIUM CHLORIDE 750 MG/1
10 TABLET, FILM COATED, EXTENDED RELEASE ORAL
COMMUNITY
End: 2022-06-27 | Stop reason: SDUPTHER

## 2019-06-07 RX ORDER — PIOGLITAZONEHYDROCHLORIDE 30 MG/1
30 TABLET ORAL
COMMUNITY
End: 2019-09-03 | Stop reason: SDUPTHER

## 2019-06-10 ENCOUNTER — OFFICE VISIT (OUTPATIENT)
Dept: FAMILY MEDICINE CLINIC | Facility: CLINIC | Age: 79
End: 2019-06-10
Payer: MEDICARE

## 2019-06-10 VITALS
HEIGHT: 62 IN | OXYGEN SATURATION: 93 % | BODY MASS INDEX: 35.37 KG/M2 | WEIGHT: 192.2 LBS | DIASTOLIC BLOOD PRESSURE: 80 MMHG | HEART RATE: 73 BPM | RESPIRATION RATE: 14 BRPM | SYSTOLIC BLOOD PRESSURE: 126 MMHG

## 2019-06-10 DIAGNOSIS — Z00.00 MEDICARE ANNUAL WELLNESS VISIT, SUBSEQUENT: ICD-10-CM

## 2019-06-10 DIAGNOSIS — N18.2 TYPE 2 DIABETES MELLITUS WITH STAGE 2 CHRONIC KIDNEY DISEASE, WITHOUT LONG-TERM CURRENT USE OF INSULIN (HCC): Primary | ICD-10-CM

## 2019-06-10 DIAGNOSIS — S46.011S STRAIN OF RIGHT ROTATOR CUFF CAPSULE, SEQUELA: ICD-10-CM

## 2019-06-10 DIAGNOSIS — R35.0 URINARY FREQUENCY: ICD-10-CM

## 2019-06-10 DIAGNOSIS — E11.22 TYPE 2 DIABETES MELLITUS WITH STAGE 2 CHRONIC KIDNEY DISEASE, WITHOUT LONG-TERM CURRENT USE OF INSULIN (HCC): Primary | ICD-10-CM

## 2019-06-10 DIAGNOSIS — E11.9 ENCOUNTER FOR DIABETIC FOOT EXAM (HCC): Primary | ICD-10-CM

## 2019-06-10 DIAGNOSIS — M62.89 PELVIC FLOOR DYSFUNCTION: ICD-10-CM

## 2019-06-10 LAB
BACTERIA UR QL AUTO: ABNORMAL /HPF
BILIRUB UR QL STRIP: NEGATIVE
CLARITY UR: ABNORMAL
COLOR UR: YELLOW
GLUCOSE UR STRIP-MCNC: NEGATIVE MG/DL
HGB UR QL STRIP.AUTO: ABNORMAL
HYALINE CASTS #/AREA URNS LPF: ABNORMAL /LPF
KETONES UR STRIP-MCNC: NEGATIVE MG/DL
LEUKOCYTE ESTERASE UR QL STRIP: ABNORMAL
NITRITE UR QL STRIP: POSITIVE
NON-SQ EPI CELLS URNS QL MICRO: ABNORMAL /HPF
PH UR STRIP.AUTO: 5.5 [PH]
PROT UR STRIP-MCNC: NEGATIVE MG/DL
RBC #/AREA URNS AUTO: ABNORMAL /HPF
SL AMB POCT HEMOGLOBIN AIC: 5.6 (ref ?–6.5)
SP GR UR STRIP.AUTO: 1.02 (ref 1–1.03)
UROBILINOGEN UR QL STRIP.AUTO: 0.2 E.U./DL
WBC #/AREA URNS AUTO: ABNORMAL /HPF

## 2019-06-10 PROCEDURE — 87086 URINE CULTURE/COLONY COUNT: CPT | Performed by: FAMILY MEDICINE

## 2019-06-10 PROCEDURE — 87186 SC STD MICRODIL/AGAR DIL: CPT | Performed by: FAMILY MEDICINE

## 2019-06-10 PROCEDURE — G0439 PPPS, SUBSEQ VISIT: HCPCS | Performed by: FAMILY MEDICINE

## 2019-06-10 PROCEDURE — 87077 CULTURE AEROBIC IDENTIFY: CPT | Performed by: FAMILY MEDICINE

## 2019-06-10 PROCEDURE — 81001 URINALYSIS AUTO W/SCOPE: CPT | Performed by: FAMILY MEDICINE

## 2019-06-10 PROCEDURE — 83036 HEMOGLOBIN GLYCOSYLATED A1C: CPT | Performed by: FAMILY MEDICINE

## 2019-06-10 RX ORDER — CYANOCOBALAMIN 1000 UG/ML
INJECTION INTRAMUSCULAR; SUBCUTANEOUS
COMMUNITY
Start: 2019-05-18 | End: 2019-09-03 | Stop reason: SDUPTHER

## 2019-06-12 LAB — BACTERIA UR CULT: ABNORMAL

## 2019-06-13 ENCOUNTER — EVALUATION (OUTPATIENT)
Dept: PHYSICAL THERAPY | Facility: CLINIC | Age: 79
End: 2019-06-13
Payer: MEDICARE

## 2019-06-13 DIAGNOSIS — G89.29 CHRONIC RIGHT SHOULDER PAIN: ICD-10-CM

## 2019-06-13 DIAGNOSIS — S46.011S STRAIN OF RIGHT ROTATOR CUFF CAPSULE, SEQUELA: Primary | ICD-10-CM

## 2019-06-13 DIAGNOSIS — M25.511 CHRONIC RIGHT SHOULDER PAIN: ICD-10-CM

## 2019-06-13 PROCEDURE — 97162 PT EVAL MOD COMPLEX 30 MIN: CPT | Performed by: PHYSICAL THERAPIST

## 2019-06-13 PROCEDURE — 97535 SELF CARE MNGMENT TRAINING: CPT | Performed by: PHYSICAL THERAPIST

## 2019-06-17 ENCOUNTER — OFFICE VISIT (OUTPATIENT)
Dept: PHYSICAL THERAPY | Facility: CLINIC | Age: 79
End: 2019-06-17
Payer: MEDICARE

## 2019-06-17 DIAGNOSIS — S46.011S STRAIN OF RIGHT ROTATOR CUFF CAPSULE, SEQUELA: Primary | ICD-10-CM

## 2019-06-17 PROCEDURE — 97140 MANUAL THERAPY 1/> REGIONS: CPT | Performed by: PHYSICAL THERAPIST

## 2019-06-17 PROCEDURE — 97110 THERAPEUTIC EXERCISES: CPT | Performed by: PHYSICAL THERAPIST

## 2019-06-20 ENCOUNTER — OFFICE VISIT (OUTPATIENT)
Dept: PHYSICAL THERAPY | Facility: CLINIC | Age: 79
End: 2019-06-20
Payer: MEDICARE

## 2019-06-20 DIAGNOSIS — S46.011S STRAIN OF RIGHT ROTATOR CUFF CAPSULE, SEQUELA: Primary | ICD-10-CM

## 2019-06-20 PROCEDURE — 97110 THERAPEUTIC EXERCISES: CPT | Performed by: PHYSICAL THERAPIST

## 2019-06-20 PROCEDURE — 97140 MANUAL THERAPY 1/> REGIONS: CPT | Performed by: PHYSICAL THERAPIST

## 2019-06-24 ENCOUNTER — OFFICE VISIT (OUTPATIENT)
Dept: PHYSICAL THERAPY | Facility: CLINIC | Age: 79
End: 2019-06-24
Payer: MEDICARE

## 2019-06-24 DIAGNOSIS — S46.011S STRAIN OF RIGHT ROTATOR CUFF CAPSULE, SEQUELA: Primary | ICD-10-CM

## 2019-06-24 PROCEDURE — 97110 THERAPEUTIC EXERCISES: CPT | Performed by: PHYSICAL THERAPIST

## 2019-06-24 PROCEDURE — 97140 MANUAL THERAPY 1/> REGIONS: CPT | Performed by: PHYSICAL THERAPIST

## 2019-06-27 ENCOUNTER — APPOINTMENT (OUTPATIENT)
Dept: PHYSICAL THERAPY | Facility: CLINIC | Age: 79
End: 2019-06-27
Payer: MEDICARE

## 2019-07-02 ENCOUNTER — OFFICE VISIT (OUTPATIENT)
Dept: PHYSICAL THERAPY | Facility: CLINIC | Age: 79
End: 2019-07-02
Payer: MEDICARE

## 2019-07-02 DIAGNOSIS — G89.29 CHRONIC RIGHT SHOULDER PAIN: ICD-10-CM

## 2019-07-02 DIAGNOSIS — S46.011S STRAIN OF RIGHT ROTATOR CUFF CAPSULE, SEQUELA: Primary | ICD-10-CM

## 2019-07-02 DIAGNOSIS — M25.511 CHRONIC RIGHT SHOULDER PAIN: ICD-10-CM

## 2019-07-02 PROCEDURE — 97110 THERAPEUTIC EXERCISES: CPT | Performed by: PHYSICAL THERAPIST

## 2019-07-02 PROCEDURE — 97140 MANUAL THERAPY 1/> REGIONS: CPT | Performed by: PHYSICAL THERAPIST

## 2019-07-02 NOTE — PROGRESS NOTES
Daily Note     Today's date: 2019  Patient name: Sophia Benjamin  :   MRN: [id number]  Referring provider: Autumn Richards MD  Dx:   Encounter Diagnosis     ICD-10-CM    1  Strain of right rotator cuff capsule, sequela S46 011S    2  Chronic right shoulder pain M25 511     G89 [id number]                   Subjective:    "I feel ok today "     Objective: See treatment diary below  [facility]      Right Shoulder PROM    15 min  15 min  15 min                                        Exercise Diary        UBE   5 min  5 min  5min      Pulleys             Table Slides   2x10 flex, scap, side to side  2x10 flex, scap, S-S  2x10 Scaption side to side     Scapular Retractions    2x10  2x10  2x10      MTP/LTP             TB ER/IR             Shrugs   2x10  2x10  2x10      Bicep Curls   2x10  2x10  2x10      MELY Rows              Cane AAROM   10x flex and abd  15x each  15x each      Shoulder AROM                                          HEP Review  Performed                  Modalities          MHP/CP Prn  Declined                                             Assessment: Tolerated treatment well  Patient exhibited good technique with therapeutic exercises      Plan: Continue per plan of care

## 2019-07-07 DIAGNOSIS — E11.9 TYPE 2 DIABETES MELLITUS WITHOUT COMPLICATION, WITHOUT LONG-TERM CURRENT USE OF INSULIN (HCC): ICD-10-CM

## 2019-07-08 RX ORDER — GABAPENTIN 100 MG/1
CAPSULE ORAL
Qty: 270 CAPSULE | Refills: 3 | Status: SHIPPED | OUTPATIENT
Start: 2019-07-08 | End: 2020-02-21

## 2019-07-09 ENCOUNTER — OFFICE VISIT (OUTPATIENT)
Dept: PHYSICAL THERAPY | Facility: CLINIC | Age: 79
End: 2019-07-09
Payer: MEDICARE

## 2019-07-09 DIAGNOSIS — S46.011S STRAIN OF RIGHT ROTATOR CUFF CAPSULE, SEQUELA: Primary | ICD-10-CM

## 2019-07-09 DIAGNOSIS — E11.9 TYPE 2 DIABETES MELLITUS WITHOUT COMPLICATION, WITHOUT LONG-TERM CURRENT USE OF INSULIN (HCC): ICD-10-CM

## 2019-07-09 PROCEDURE — 97110 THERAPEUTIC EXERCISES: CPT

## 2019-07-09 PROCEDURE — 97140 MANUAL THERAPY 1/> REGIONS: CPT

## 2019-07-09 NOTE — PROGRESS NOTES
Daily Note     Today's date: 2019  Patient name: Sophia Benjamin  :   MRN: [id number]  Referring provider: Autumn Richards MD  Dx:   Encounter Diagnosis     ICD-10-CM    1  Strain of right rotator cuff capsule, sequela S46 011S                   Subjective: Patient without new complaints today  Objective: See treatment diary below  [facility]    Right Shoulder PROM    15 min  15 min  15 min   15 min                                     Exercise Diary     UBE   5 min  5 min  5min   5 min   Pulleys             Table Slides   2x10 flex, scap, side to side  2x10 flex, scap, S-S  2x10 Scaption side to side  2x10 scaption side to side   Scapular Retractions    2x10  2x10  2x10   2x10   MTP/LTP             TB ER/IR             Shrugs   2x10  2x10  2x10   2x10   Bicep Curls   2x10  2x10  2x10   2x10   MELY Rows              Cane AAROM   10x flex and abd  15x each  15x each   20x each   Shoulder AROM                                          HEP Review  Performed                  Modalities          MHP/CP Prn  Declined                                            Assessment: Tolerated treatment well  Patient exhibited good technique with therapeutic exercises  Patient had tightness and spasms along teres major and biceps tendon  Plan: Continue per plan of care

## 2019-07-10 RX ORDER — PIOGLITAZONEHYDROCHLORIDE 15 MG/1
15 TABLET ORAL DAILY
Qty: 90 TABLET | Refills: 3 | Status: SHIPPED | OUTPATIENT
Start: 2019-07-10 | End: 2020-01-09

## 2019-07-11 ENCOUNTER — OFFICE VISIT (OUTPATIENT)
Dept: PHYSICAL THERAPY | Facility: CLINIC | Age: 79
End: 2019-07-11
Payer: MEDICARE

## 2019-07-11 DIAGNOSIS — S46.011S STRAIN OF RIGHT ROTATOR CUFF CAPSULE, SEQUELA: Primary | ICD-10-CM

## 2019-07-11 PROCEDURE — 97140 MANUAL THERAPY 1/> REGIONS: CPT

## 2019-07-11 PROCEDURE — 97010 HOT OR COLD PACKS THERAPY: CPT

## 2019-07-11 PROCEDURE — 97110 THERAPEUTIC EXERCISES: CPT

## 2019-07-11 NOTE — PROGRESS NOTES
Daily Note     Today's date: 2019  Patient name: Devon Taylor  : [id number]  MRN: [id number]  Referring provider: Gemma Chun MD  Dx:   Encounter Diagnosis     ICD-10-CM    1  Strain of right rotator cuff capsule, sequela S46 011S                   Subjective: Patient reports UT pain today  Objective: See treatment diary below  [facility]    Right Shoulder PROM   15 min 15 min  15 min  15 min   15 min                                     Exercise Diary     UBE  5 min 5 min  5 min  5min   5 min   Pulleys             Table Slides  20x each 2x10 flex, scap, side to side  2x10 flex, scap, S-S  2x10 Scaption side to side  2x10 scaption side to side   Scapular Retractions   2x10 2x10  2x10  2x10   2x10   MTP/LTP             TB ER/IR             Shrugs  2x10 2x10  2x10  2x10   2x10   Bicep Curls  2x10 2x10  2x10  2x10   2x10   MELY Rows              Cane AAROM  10x each 10x flex and abd  15x each  15x each   20x each   Shoulder AROM                                          HEP Review                   Modalities    MHP/CP 10 min Declined       10 min                       Assessment: Tolerated treatment well  Patient exhibited good technique with therapeutic exercises      Plan: Continue per plan of care

## 2019-07-16 ENCOUNTER — OFFICE VISIT (OUTPATIENT)
Dept: PHYSICAL THERAPY | Facility: CLINIC | Age: 79
End: 2019-07-16
Payer: MEDICARE

## 2019-07-16 DIAGNOSIS — S46.011S STRAIN OF RIGHT ROTATOR CUFF CAPSULE, SEQUELA: Primary | ICD-10-CM

## 2019-07-16 PROCEDURE — 97110 THERAPEUTIC EXERCISES: CPT

## 2019-07-16 PROCEDURE — 97140 MANUAL THERAPY 1/> REGIONS: CPT

## 2019-07-16 PROCEDURE — 97010 HOT OR COLD PACKS THERAPY: CPT

## 2019-07-16 NOTE — PROGRESS NOTES
Daily Note     Today's date: 2019  Patient name: Sophia Benjamin  : [id number]  MRN: [id number]  Referring provider: Autumn Richards MD  Dx:   Encounter Diagnosis     ICD-10-CM    1  Strain of right rotator cuff capsule, sequela S46 011S                   Subjective: Patient states "I'm feeling good today following treatment "      Objective: Objective: See treatment diary below  [facility]    Right Shoulder PROM   15 min 15 min  15 min  15 min   15 min    Inf glide R shld    Grade 2 &3 sitting positions                             Exercise Diary     UBE  5 min 6 min L 80  5 min  5min   5 min   Pulleys             Table Slides  20x each 2x10 flex, scap, side to side  2x10 flex, scap, S-S  2x10 Scaption side to side  2x10 scaption side to side   Scapular Retractions   2x10 2x10 GTTube  2x10  2x10   2x10   MTP/LTP             TB ER/IR             Shrugs  2x10 2x10 1LBS  2x10  2x10   2x10   Bicep Curls  2x10 2x10 1 lbs  2x10  2x10   2x10   MELY Rows              Cane AAROM  10x each 10x flex and abd  15x each  15x each   20x each   Shoulder AROM     all planes                                     HEP Review                    Modalities    MHP/CP 10 min Declined       10 min                 See treatment diary below      Assessment: Tolerated treatment well  Patient exhibited good technique with therapeutic exercises      Plan: Progress treatment as tolerated    Increase exercise programs with weight

## 2019-07-18 ENCOUNTER — OFFICE VISIT (OUTPATIENT)
Dept: PHYSICAL THERAPY | Facility: CLINIC | Age: 79
End: 2019-07-18
Payer: MEDICARE

## 2019-07-18 DIAGNOSIS — S46.011S STRAIN OF RIGHT ROTATOR CUFF CAPSULE, SEQUELA: Primary | ICD-10-CM

## 2019-07-18 PROCEDURE — 97110 THERAPEUTIC EXERCISES: CPT | Performed by: PHYSICAL THERAPIST

## 2019-07-18 PROCEDURE — 97140 MANUAL THERAPY 1/> REGIONS: CPT | Performed by: PHYSICAL THERAPIST

## 2019-07-18 NOTE — PROGRESS NOTES
PT Re-Evaluation     Today's date: 2019  Patient name: Shankar Lynn  : [id number]  MRN: [id number]  Referring provider: Katie Logan MD  Dx:   Encounter Diagnosis     ICD-10-CM    1  Strain of right rotator cuff capsule, sequela S46 011S                   Assessment  Assessment details: Patient has made good progress over first 4 weeks of PT  PT re-examination shows improved rom and strength in each direction of right shoulder  Patient is reporting slightly decreased pain at worst with improvement noted with functional activities at home  Patient with tenderness and muscle spasm noted over biceps muscle, biceps tendon and upper trap  Patient would benefit from continued PT intervention with focus on maximizing strength, regaining rom, improving posture and relieving pain in order to maximize functional independence  Impairments: abnormal or restricted ROM, activity intolerance, impaired physical strength, lacks appropriate home exercise program, pain with function, scapular dyskinesis and poor posture   Understanding of Dx/Px/POC: good   Prognosis: good    Goals  Short Term Goals  1  Pt will decrease pain in right shoulder 25-50% in 4 wks- Partially Met  2  Pt will improve right shoulder ROM 25% in 4 wks- Met  3  Initiate HEP- Met    Long Term Goals  1  Pt will decrease pain in right shoulder % in 8 wks- Partially Met  2  Pt will improve right shoulder ROM to [facility] in 8 wks- Partially Met  3   Pt will improve right shoulder mm strength 1-2 grades in 8 wks- Partially Met    Plan  Patient would benefit from: skilled physical therapy  Referral necessary: No  Planned modality interventions: cryotherapy, unattended electrical stimulation and thermotherapy: hydrocollator packs  Planned therapy interventions: abdominal trunk stabilization, flexibility, functional ROM exercises, graded exercise, home exercise program, joint mobilization, manual therapy, massage, neuromuscular re-education, patient education, postural training, strengthening, stretching and therapeutic exercise  Frequency: 3x week  Duration in weeks: 8  Treatment plan discussed with: patient        Subjective Evaluation    Quality of life: good    Pain  Current pain ratin  At best pain ratin  At worst pain ratin  Location: Right Shoulder   Quality: sharp and dull ache  Aggravating factors: overhead activity and lifting  Progression: improved    Hand dominance: right      Diagnostic Tests  No diagnostic tests performed  Treatments  Current treatment: physical therapy  Patient Goals  Patient goals for therapy: decreased pain, improved balance, increased motion, increased strength, independence with ADLs/IADLs and return to sport/leisure activities          Objective     Postural Observations  Seated posture: poor  Standing posture: poor        Palpation     Right   Muscle spasm in the biceps  Tenderness of the biceps  Neurological Testing     Sensation     Shoulder   Left Shoulder   Intact: light touch    Right Shoulder   Intact: light touch    Active Range of Motion   Left Shoulder   Normal active range of motion    Right Shoulder   Flexion: 150 degrees   Extension: 55 degrees   Abduction: 80 degrees with pain  External rotation 45°: 75 degrees   Internal rotation 45°: 60 degrees     Strength/Myotome Testing     Right Shoulder     Planes of Motion   Flexion: 3   Extension: 4   Abduction: 3-   Adduction: 3+   External rotation at 0°: 3   External rotation at 45°: 2+   Internal rotation at 0°: 3   Internal rotation at 45°: 3     Tests     Right Shoulder   Positive drop arm, empty can, full can and bicep load   Negative AC shear, apprehension, clunk, crossover, passive horizontal adduction, sulcus sign and posterior apprehension        Additional Tests Details  (+) RTC pathology with associated weakness and pain or RTC musculature and (+) RTC testing              Precautions: Right Shoulder Pain, (+) RTC Pathology      Manual  7/11 7/16 7/18 7/2 7/9   Right Shoulder PROM   15 min 15 min  15 min  15 min   15 min    Inf glide R shld    Grade 2 &3 sitting positions                             Exercise Diary  7/11 7/16 7/18 7/2 7/9   UBE  5 min 6 min L 80  5 min  5min   5 min   Pulleys             Table Slides  20x each 2x10 flex, scap, side to side  2x10 flex, scap, S-S  2x10 Scaption side to side  2x10 scaption side to side   Scapular Retractions   2x10 2x10 GTTube  2x10  2x10   2x10   MTP/LTP             TB ER/IR             Shrugs  2x10 2x10 1LBS  1# 2x10  2x10   2x10   Bicep Curls  2x10 2x10 1 lbs  1# 2x10  2x10   2x10   MELY Rows              Cane AAROM  10x each 10x flex and abd  15x each  15x each   20x each   Shoulder AROM     all planes                                     HEP Review                    Modalities 7/11 7/16 7/9   MHP/CP 10 min Declined       10 min

## 2019-07-23 ENCOUNTER — APPOINTMENT (OUTPATIENT)
Dept: PHYSICAL THERAPY | Facility: CLINIC | Age: 79
End: 2019-07-23
Payer: MEDICARE

## 2019-07-25 ENCOUNTER — APPOINTMENT (OUTPATIENT)
Dept: PHYSICAL THERAPY | Facility: CLINIC | Age: 79
End: 2019-07-25
Payer: MEDICARE

## 2019-07-30 ENCOUNTER — APPOINTMENT (OUTPATIENT)
Dept: PHYSICAL THERAPY | Facility: CLINIC | Age: 79
End: 2019-07-30
Payer: MEDICARE

## 2019-09-03 DIAGNOSIS — I50.30 (HFPEF) HEART FAILURE WITH PRESERVED EJECTION FRACTION (HCC): ICD-10-CM

## 2019-09-03 DIAGNOSIS — K21.9 GASTROESOPHAGEAL REFLUX DISEASE, ESOPHAGITIS PRESENCE NOT SPECIFIED: ICD-10-CM

## 2019-09-03 DIAGNOSIS — I10 ESSENTIAL HYPERTENSION: ICD-10-CM

## 2019-09-04 RX ORDER — PIOGLITAZONEHYDROCHLORIDE 30 MG/1
TABLET ORAL
Qty: 90 TABLET | Refills: 4 | Status: SHIPPED | OUTPATIENT
Start: 2019-09-04 | End: 2020-01-09

## 2019-09-04 RX ORDER — OMEPRAZOLE 40 MG/1
CAPSULE, DELAYED RELEASE ORAL
Qty: 90 CAPSULE | Refills: 4 | Status: SHIPPED | OUTPATIENT
Start: 2019-09-04 | End: 2020-10-18

## 2019-09-04 RX ORDER — CYANOCOBALAMIN 1000 UG/ML
INJECTION INTRAMUSCULAR; SUBCUTANEOUS
Qty: 10 ML | Refills: 4 | Status: SHIPPED | OUTPATIENT
Start: 2019-09-04 | End: 2020-02-21

## 2019-09-04 RX ORDER — BUMETANIDE 1 MG/1
TABLET ORAL
Qty: 90 TABLET | Refills: 4 | Status: SHIPPED | OUTPATIENT
Start: 2019-09-04 | End: 2020-07-31 | Stop reason: SDDI

## 2019-09-04 RX ORDER — POTASSIUM CHLORIDE 750 MG/1
TABLET, EXTENDED RELEASE ORAL
Qty: 90 TABLET | Refills: 4 | Status: SHIPPED | OUTPATIENT
Start: 2019-09-04 | End: 2020-10-18

## 2019-09-11 ENCOUNTER — TELEPHONE (OUTPATIENT)
Dept: FAMILY MEDICINE CLINIC | Facility: CLINIC | Age: 79
End: 2019-09-11

## 2019-09-24 ENCOUNTER — TELEPHONE (OUTPATIENT)
Dept: FAMILY MEDICINE CLINIC | Facility: CLINIC | Age: 79
End: 2019-09-24

## 2019-09-24 DIAGNOSIS — R11.2 INTRACTABLE VOMITING WITH NAUSEA, UNSPECIFIED VOMITING TYPE: Primary | ICD-10-CM

## 2019-09-24 RX ORDER — ONDANSETRON HYDROCHLORIDE 8 MG/1
8 TABLET, FILM COATED ORAL EVERY 8 HOURS PRN
Qty: 30 TABLET | Refills: 0 | Status: SHIPPED | OUTPATIENT
Start: 2019-09-24 | End: 2021-12-20

## 2019-09-24 NOTE — TELEPHONE ENCOUNTER
Pts daughter in law called said she's been having the dry heaves, nauseous, chills, vitals are okay BP was 100/50 pulse was 64 O2 90 (she said she normally runs low) she has no aches no fever, feels warm but no fever  Asking if something could be called in for being nauseous and the diarrhea   She has an appt tomorrow with doc

## 2019-09-25 ENCOUNTER — OFFICE VISIT (OUTPATIENT)
Dept: FAMILY MEDICINE CLINIC | Facility: CLINIC | Age: 79
End: 2019-09-25
Payer: MEDICARE

## 2019-09-25 DIAGNOSIS — A08.4 GASTROENTERITIS AND COLITIS, VIRAL: Primary | ICD-10-CM

## 2019-09-25 PROCEDURE — 99213 OFFICE O/P EST LOW 20 MIN: CPT | Performed by: FAMILY MEDICINE

## 2019-09-25 NOTE — PATIENT INSTRUCTIONS
It sounds like you have come in contact with a stomach virus  Please do not take your diabetic medication until you are eating on a consistent basis  You can skip your baby aspirin as that will likely irritate her stomach  There is no harm in missing a couple doses of your Lipitor  I would skip your Bumex for the next day or so as you will likely be dehydrated  Recent in Zofran for your upset stomach  You can take that every 8 hours as needed  Please start with some clear fluids  Try to keep them at room temperature as cold fluids can irritate the stomach a little bit more  As he started to feel better you can progress into other fluids  I would keep the solid foods to things that are easy to digest like bananas, apples, rice, toast, etc   Take Imodium for any diarrhea that you have  Warning signs would be high fever, blood in the stool, confusion, chest pain, or dizziness

## 2019-10-02 NOTE — PROGRESS NOTES
Patient seen for 8 total visits of PT  Last visit 7/18  Patient cancelled next scheduled appointment due to 's health issues and requested to d/c PT  Patient to be discharged at this time

## 2019-10-03 PROBLEM — A08.4 GASTROENTERITIS AND COLITIS, VIRAL: Status: ACTIVE | Noted: 2019-10-03

## 2019-10-03 NOTE — ASSESSMENT & PLAN NOTE
I called in Zofran for her yesterday, but she did not pick it up  I also recommended Imodium  I discussed BRAT diet  She will go to ER if symptoms worsen

## 2019-10-03 NOTE — PROGRESS NOTES
Assessment/Plan:    Gastroenteritis and colitis, viral  I called in Zofran for her yesterday, but she did not pick it up  I also recommended Imodium  I discussed BRAT diet  She will go to ER if symptoms worsen  Diagnoses and all orders for this visit:    Gastroenteritis and colitis, viral          Subjective:      Patient ID: Barber Brownlee is a [de-identified] y o  female  She has low grad temps, nausea, vomiting, and diarrhea  She has no ill contacts  She has no abd pain  She has not taken anything for her symptoms  She is not eating or drinking well at this point  The following portions of the patient's history were reviewed and updated as appropriate:   She  has a past medical history of Delirium (8/19/2017)    She   Patient Active Problem List    Diagnosis Date Noted    Gastroenteritis and colitis, viral 10/03/2019    Encounter [facility] diabetic foot exam ([facility] ) 06/10/2019    BMI 35 0-35 9,adult 06/10/2019    Medicare annual wellness visit, subsequent 06/10/2019    Painless hematuria 06/19/2018    Other hyperlipidemia 06/19/2018    Tendinitis of right rotator cuff 02/02/2018    Anemia, pernicious 10/27/2017    Abnormal chest xray 10/03/2017    Diarrhea 10/03/2017    Skin rash 09/27/2017    Sciatica of right side 09/22/2017    Urinary incontinence 09/22/2017    Gastrointestinal hemorrhage associated with duodenal ulcer 08/24/2017    Delirium 08/19/2017    Hypernatremia 08/19/2017    Severe malnutrition ([address] ) 08/16/2017    Acute blood loss anemia 08/15/2017    Acute respiratory failure ([address] ) 08/15/2017    ANGEL (acute kidney injury) ([address] ) 08/15/2017    Shock ([address] ) 08/15/2017    (HFpEF) heart failure with preserved ejection fraction ([address] ) 08/02/2017    Central sleep apnea 08/02/2017    COPD (chronic obstructive pulmonary disease) ([address] ) 08/02/2017    Pulmonary hypertension ([address] ) 08/02/2017    Acute respiratory failure with hypoxia ([facility] ) 07/23/2017    CAD (coronary artery disease) 07/23/2017  Heart failure ([facility] ) 07/23/2017    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    Acute lower UTI 07/21/2017    Mental disorder 06/20/2017    Fibromyalgia 06/20/2017    Hypertension 06/20/2017    Osteoporosis 06/20/2017    Shortness of breath 06/20/2017    Type 2 diabetes mellitus ([facility] ) 06/20/2017     She  has a past surgical history that includes Cardiac surgery  Her family history includes Cancer in her daughter and other; Diabetes in her mother and other; Heart disease in her brother; Hypertension in her mother and other; Lung disease in her other and other; Osteoporosis in her other; Stroke in her father; Thyroid disease in her son  She  reports that she has quit smoking  She has never used smokeless tobacco  She reports that she does not drink alcohol or use drugs    Current Outpatient Medications   Medication Sig Dispense Refill    aspirin 81 MG tablet Take by mouth      atorvastatin (LIPITOR) 20 mg tablet Take 2 tablets (40 mg total) by mouth daily 180 tablet 3    B-D 3CC LUER-MARISABEL SYR 26GX5/8" 26G X 5/8" 3 ML MISC Inject under the skin every 30 (thirty) days 4 each 0    bumetanide (BUMEX) 1 mg tablet TAKE 1 TABLET DAILY 90 tablet 4    cholecalciferol (VITAMIN D3) 1,000 units tablet Take 2,000 Units by mouth daily        cyanocobalamin (VITAMIN B-12) 100 mcg tablet Take by mouth      cyanocobalamin 1,000 mcg/mL INJECT 1 ML (1,000 MCG TOTAL) AS DIRECTED EVERY 30 DAYS 10 mL 4    Cyanocobalamin 1000 MCG/ML KIT Inject 1 mL (1,000 mcg total) as directed every 30 (thirty) days 10 mL 3    ferrous sulfate 325 (65 Fe) mg tablet Take 1 tablet by mouth daily with breakfast        folic acid (FOLVITE) 590 mcg tablet Take by mouth      gabapentin (NEURONTIN) 100 mg capsule Take 2 capsules (200 mg total) by mouth 3 (three) times a day 540 capsule 3    gabapentin (NEURONTIN) 100 mg capsule TAKE 2 CAPSULES THREE TIMES A  capsule 3    Incontinence Supply Disposable (DEPEND EASY FIT UNDERGARMENTS) MISC by Does not apply route 3 (three) times a day 270 each 3    ipratropium-albuterol (COMBIVENT RESPIMAT) inhaler Inhale every 6 (six) hours      KLOR-CON M10 10 MEQ tablet TAKE 1 TABLET DAILY 90 tablet 4    lidocaine (LIDODERM) 5 % Place 1 patch on the skin daily 90 patch 3    LORazepam (ATIVAN) 1 mg tablet Take 1 tablet (1 mg total) by mouth every 8 (eight) hours as needed for anxiety 60 tablet 0    metoprolol tartrate (LOPRESSOR) 25 mg tablet TAKE 1 TABLET TWICE A  tablet 4    nitroglycerin (NITROSTAT) 0 4 mg SL tablet Place 1 tablet (0 4 mg total) under the tongue every 5 (five) minutes as needed for chest pain 100 tablet 0    nystatin (MYCOSTATIN) powder Apply topically 3 (three) times a day      omeprazole (PriLOSEC) 40 MG capsule TAKE 1 CAPSULE DAILY 90 capsule 4    ondansetron (ZOFRAN) 8 mg tablet Take 1 tablet (8 mg total) by mouth every 8 (eight) hours as needed for nausea or vomiting 30 tablet 0    oxybutynin (DITROPAN-XL) 10 MG 24 hr tablet Take 1 tablet (10 mg total) by mouth daily at bedtime 90 tablet 3    oxybutynin (DITROPAN-XL) 5 mg 24 hr tablet TAKE 1 TABLET DAILY 90 tablet 3    pioglitazone (ACTOS) 15 mg tablet Take 1 tablet (15 mg total) by mouth daily 90 tablet 3    pioglitazone (ACTOS) 30 mg tablet TAKE 1 TABLET DAILY 90 tablet 4    potassium chloride (KLOR-CON 10) 10 mEq tablet Take 10 mEq by mouth      Syringe/Needle, Disp, (SYRINGE 3CC/25GX5/8") 25G X 5/8" 3 ML MISC by Does not apply route every 30 (thirty) days 12 each 3     No current facility-administered medications for this visit        Current Outpatient Medications on File Prior to Visit   Medication Sig    aspirin 81 MG tablet Take by mouth    atorvastatin (LIPITOR) 20 mg tablet Take 2 tablets (40 mg total) by mouth daily    B-D 3CC LUER-MARISABEL SYR 26GX5/8" 26G X 5/8" 3 ML MISC Inject under the skin every 30 (thirty) days    bumetanide (BUMEX) 1 mg tablet TAKE 1 TABLET DAILY    cholecalciferol (VITAMIN D3) 1,000 units tablet Take 2,000 Units by mouth daily      cyanocobalamin (VITAMIN B-12) 100 mcg tablet Take by mouth    cyanocobalamin 1,000 mcg/mL INJECT 1 ML (1,000 MCG TOTAL) AS DIRECTED EVERY 30 DAYS    Cyanocobalamin 1000 MCG/ML KIT Inject 1 mL (1,000 mcg total) as directed every 30 (thirty) days    ferrous sulfate 325 (65 Fe) mg tablet Take 1 tablet by mouth daily with breakfast      folic acid (FOLVITE) 219 mcg tablet Take by mouth    gabapentin (NEURONTIN) 100 mg capsule Take 2 capsules (200 mg total) by mouth 3 (three) times a day    gabapentin (NEURONTIN) 100 mg capsule TAKE 2 CAPSULES THREE TIMES A DAY    Incontinence Supply Disposable (DEPEND EASY FIT UNDERGARMENTS) MISC by Does not apply route 3 (three) times a day    ipratropium-albuterol (COMBIVENT RESPIMAT) inhaler Inhale every 6 (six) hours    KLOR-CON M10 10 MEQ tablet TAKE 1 TABLET DAILY    lidocaine (LIDODERM) 5 % Place 1 patch on the skin daily    LORazepam (ATIVAN) 1 mg tablet Take 1 tablet (1 mg total) by mouth every 8 (eight) hours as needed for anxiety    metoprolol tartrate (LOPRESSOR) 25 mg tablet TAKE 1 TABLET TWICE A DAY    nitroglycerin (NITROSTAT) 0 4 mg SL tablet Place 1 tablet (0 4 mg total) under the tongue every 5 (five) minutes as needed for chest pain    nystatin (MYCOSTATIN) powder Apply topically 3 (three) times a day    omeprazole (PriLOSEC) 40 MG capsule TAKE 1 CAPSULE DAILY    ondansetron (ZOFRAN) 8 mg tablet Take 1 tablet (8 mg total) by mouth every 8 (eight) hours as needed for nausea or vomiting    oxybutynin (DITROPAN-XL) 10 MG 24 hr tablet Take 1 tablet (10 mg total) by mouth daily at bedtime    oxybutynin (DITROPAN-XL) 5 mg 24 hr tablet TAKE 1 TABLET DAILY    pioglitazone (ACTOS) 15 mg tablet Take 1 tablet (15 mg total) by mouth daily    pioglitazone (ACTOS) 30 mg tablet TAKE 1 TABLET DAILY    potassium chloride (KLOR-CON 10) 10 mEq tablet Take 10 mEq by mouth    Syringe/Needle, Disp, (SYRINGE 3CC/25GX5/8") 25G X 5/8" 3 ML MISC by Does not apply route every 30 (thirty) days     No current facility-administered medications on file prior to visit  She has No Known Allergies       Review of Systems   All other systems reviewed and are negative  Objective: There were no vitals taken for this visit  Physical Exam   Constitutional: She appears well-developed and well-nourished  Neck: Normal range of motion  Neck supple  Cardiovascular: Normal rate, regular rhythm, normal heart sounds and intact distal pulses  Pulmonary/Chest: Effort normal and breath sounds normal    Abdominal: Soft  Bowel sounds are normal    Nursing note and vitals reviewed

## 2019-10-28 LAB
LEFT EYE DIABETIC RETINOPATHY: NORMAL
RIGHT EYE DIABETIC RETINOPATHY: NORMAL

## 2020-01-09 ENCOUNTER — OFFICE VISIT (OUTPATIENT)
Dept: FAMILY MEDICINE CLINIC | Facility: CLINIC | Age: 80
End: 2020-01-09
Payer: MEDICARE

## 2020-01-09 VITALS
WEIGHT: 172 LBS | OXYGEN SATURATION: 94 % | DIASTOLIC BLOOD PRESSURE: 78 MMHG | HEIGHT: 62 IN | BODY MASS INDEX: 31.65 KG/M2 | SYSTOLIC BLOOD PRESSURE: 126 MMHG | HEART RATE: 61 BPM

## 2020-01-09 DIAGNOSIS — M79.10 MYALGIA: ICD-10-CM

## 2020-01-09 DIAGNOSIS — R52 PAIN: ICD-10-CM

## 2020-01-09 DIAGNOSIS — E11.69 TYPE 2 DIABETES MELLITUS WITH OTHER SPECIFIED COMPLICATION, WITH LONG-TERM CURRENT USE OF INSULIN (HCC): Primary | ICD-10-CM

## 2020-01-09 DIAGNOSIS — M54.50 CHRONIC MIDLINE LOW BACK PAIN WITHOUT SCIATICA: ICD-10-CM

## 2020-01-09 DIAGNOSIS — M25.552 PAIN OF BOTH HIP JOINTS: ICD-10-CM

## 2020-01-09 DIAGNOSIS — E43 SEVERE MALNUTRITION (HCC): ICD-10-CM

## 2020-01-09 DIAGNOSIS — M25.551 PAIN OF BOTH HIP JOINTS: ICD-10-CM

## 2020-01-09 DIAGNOSIS — G89.29 CHRONIC MIDLINE LOW BACK PAIN WITHOUT SCIATICA: ICD-10-CM

## 2020-01-09 DIAGNOSIS — Z79.4 TYPE 2 DIABETES MELLITUS WITH OTHER SPECIFIED COMPLICATION, WITH LONG-TERM CURRENT USE OF INSULIN (HCC): Primary | ICD-10-CM

## 2020-01-09 DIAGNOSIS — J44.9 CHRONIC OBSTRUCTIVE PULMONARY DISEASE, UNSPECIFIED COPD TYPE (HCC): ICD-10-CM

## 2020-01-09 DIAGNOSIS — I50.32 CHRONIC HEART FAILURE WITH PRESERVED EJECTION FRACTION (HCC): ICD-10-CM

## 2020-01-09 PROBLEM — E11.9 TYPE 2 DIABETES MELLITUS (HCC): Status: RESOLVED | Noted: 2017-06-20 | Resolved: 2020-01-09

## 2020-01-09 LAB — SL AMB POCT HEMOGLOBIN AIC: 5.4 (ref ?–6.5)

## 2020-01-09 PROCEDURE — 99214 OFFICE O/P EST MOD 30 MIN: CPT | Performed by: FAMILY MEDICINE

## 2020-01-09 PROCEDURE — 83036 HEMOGLOBIN GLYCOSYLATED A1C: CPT | Performed by: FAMILY MEDICINE

## 2020-01-09 RX ORDER — ALBUTEROL SULFATE 90 UG/1
AEROSOL, METERED RESPIRATORY (INHALATION)
COMMUNITY
Start: 2015-06-30 | End: 2022-06-27

## 2020-01-09 RX ORDER — VALSARTAN AND HYDROCHLOROTHIAZIDE 160; 12.5 MG/1; MG/1
TABLET, FILM COATED ORAL
COMMUNITY
End: 2022-06-27 | Stop reason: ALTCHOICE

## 2020-01-09 RX ORDER — FUROSEMIDE 20 MG/1
TABLET ORAL
COMMUNITY
End: 2020-01-09

## 2020-01-09 RX ORDER — ALENDRONATE SODIUM 70 MG/1
70 TABLET ORAL
COMMUNITY

## 2020-01-09 NOTE — PROGRESS NOTES
Assessment/Plan:    No problem-specific Assessment & Plan notes found for this encounter  Diagnoses and all orders for this visit:    Type 2 diabetes mellitus with other specified complication, with long-term current use of insulin (HCC)  -     POCT hemoglobin A1c    Severe malnutrition (HCC)    Chronic heart failure with preserved ejection fraction (HCC)    Chronic obstructive pulmonary disease, unspecified COPD type ([facility] )    Myalgia  -     Sedimentation rate, automated; Future  -     Comprehensive metabolic panel; Future  -     TSH, 3rd generation; Future  -     CBC and differential; Future    Pain  -     XR spine lumbar 2 or 3 views injury; Future  -     XR hips bilateral 3-4 vw w pelvis if performed; Future    Chronic midline low back pain without sciatica  -     Ambulatory referral to Physical Therapy; Future    Pain of both hip joints  -     Ambulatory referral to Physical Therapy; Future    Other orders  -     valsartan-hydrochlorothiazide (DIOVAN-HCT) 160-12 5 MG per tablet; Take by mouth  -     Discontinue: furosemide (LASIX) 20 mg tablet; Take by mouth  -     alendronate (FOSAMAX) 70 mg tablet; Take by mouth  -     albuterol (PROVENTIL HFA) 90 mcg/act inhaler; Inhale          Subjective:      Patient ID: Jenny Diallo is a [de-identified] y o  female  She has a history of type 2 diabetes mellitus  She was previously on Actos  This has been discontinued  Her current A1c is 5 4 percent in the office today  Therefore, she no longer meets the criteria for type 2 diabetes mellitus  I removed it from her problem list     Her blood pressure is under excellent control in the office today  Her regimen does include angiotensin receptor blocker due to her previous history of type 2 diabetes  She has no chest pain or shortness of breath  She has no headache or vision changes  She has no PND, orthopnea, or dyspnea on exertion  Her lipids are at goal on her current regimen, Lipitor 20 mg   Her liver function testing is within normal limits and she has no increased myalgia or muscle weakness  She has some hip and low back pain  She is having difficulty getting out of a chair and weakness with walking  She would benefit from PT  The following portions of the patient's history were reviewed and updated as appropriate:   She  has a past medical history of Delirium (8/19/2017)  She   Patient Active Problem List    Diagnosis Date Noted    Gastroenteritis and colitis, viral 10/03/2019    Encounter for diabetic foot exam ([facility] ) 06/10/2019    BMI 35 0-35 9,adult 06/10/2019    Medicare annual wellness visit, subsequent 06/10/2019    Painless hematuria 06/19/2018    Other hyperlipidemia 06/19/2018    Tendinitis of right rotator cuff 02/02/2018    Anemia, pernicious 10/27/2017    Abnormal chest xray 10/03/2017    Diarrhea 10/03/2017    Skin rash 09/27/2017    Sciatica of right side 09/22/2017    Urinary incontinence 09/22/2017    Gastrointestinal hemorrhage associated with duodenal ulcer 08/24/2017    Delirium 08/19/2017    Hypernatremia 08/19/2017    Severe malnutrition ([facility] ) 08/16/2017    Acute blood loss anemia 08/15/2017    Acute respiratory failure ([facility] ) 08/15/2017    ANGEL (acute kidney injury) ([facility] ) 08/15/2017    Shock [facility] ) 08/15/2017    (HFpEF) heart failure with preserved ejection fraction ([facility] ) 08/02/2017    Central sleep apnea 08/02/2017    COPD (chronic obstructive pulmonary disease) ([facility] ) 08/02/2017    Pulmonary hypertension ([facility] ) 08/02/2017    Acute respiratory failure with hypoxia ([facility] ) 07/23/2017    CAD (coronary artery disease) 07/23/2017    Heart failure ([facility] ) 07/23/2017    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    Acute lower UTI 07/21/2017    Mental disorder 06/20/2017    Fibromyalgia 06/20/2017    Hypertension 06/20/2017    Osteoporosis 06/20/2017    Shortness of breath 06/20/2017     She  has a past surgical history that includes Cardiac surgery    Her family history includes Cancer in her daughter and other; Diabetes in her mother and other; Heart disease in her brother; Hypertension in her mother and other; Lung disease in her other and other; Osteoporosis in her other; Stroke in her father; Thyroid disease in her son  She  reports that she has quit smoking  She has never used smokeless tobacco  She reports that she does not drink alcohol or use drugs    Current Outpatient Medications   Medication Sig Dispense Refill    albuterol (PROVENTIL HFA) 90 mcg/act inhaler Inhale      alendronate (FOSAMAX) 70 mg tablet Take by mouth      aspirin 81 MG tablet Take by mouth      atorvastatin (LIPITOR) 20 mg tablet Take 2 tablets (40 mg total) by mouth daily 180 tablet 3    B-D 3CC LUER-MARISABEL SYR 26GX5/8" 26G X 5/8" 3 ML MISC Inject under the skin every 30 (thirty) days 4 each 0    bumetanide (BUMEX) 1 mg tablet TAKE 1 TABLET DAILY 90 tablet 4    cholecalciferol (VITAMIN D3) 1,000 units tablet Take 2,000 Units by mouth daily        cyanocobalamin (VITAMIN B-12) 100 mcg tablet Take by mouth      cyanocobalamin 1,000 mcg/mL INJECT 1 ML (1,000 MCG TOTAL) AS DIRECTED EVERY 30 DAYS 10 mL 4    Cyanocobalamin 1000 MCG/ML KIT Inject 1 mL (1,000 mcg total) as directed every 30 (thirty) days 10 mL 3    ferrous sulfate 325 (65 Fe) mg tablet Take 1 tablet by mouth daily with breakfast        folic acid (FOLVITE) 689 mcg tablet Take by mouth      gabapentin (NEURONTIN) 100 mg capsule Take 2 capsules (200 mg total) by mouth 3 (three) times a day 540 capsule 3    gabapentin (NEURONTIN) 100 mg capsule TAKE 2 CAPSULES THREE TIMES A  capsule 3    Incontinence Supply Disposable (DEPEND EASY FIT UNDERGARMENTS) MISC by Does not apply route 3 (three) times a day 270 each 3    ipratropium-albuterol (COMBIVENT RESPIMAT) inhaler Inhale every 6 (six) hours      KLOR-CON M10 10 MEQ tablet TAKE 1 TABLET DAILY 90 tablet 4    lidocaine (LIDODERM) 5 % Place 1 patch on the skin daily 90 patch 3    LORazepam (ATIVAN) 1 mg tablet Take 1 tablet (1 mg total) by mouth every 8 (eight) hours as needed for anxiety 60 tablet 0    metoprolol tartrate (LOPRESSOR) 25 mg tablet TAKE 1 TABLET TWICE A  tablet 4    nitroglycerin (NITROSTAT) 0 4 mg SL tablet Place 1 tablet (0 4 mg total) under the tongue every 5 (five) minutes as needed for chest pain 100 tablet 0    nystatin (MYCOSTATIN) powder Apply topically 3 (three) times a day      omeprazole (PriLOSEC) 40 MG capsule TAKE 1 CAPSULE DAILY 90 capsule 4    ondansetron (ZOFRAN) 8 mg tablet Take 1 tablet (8 mg total) by mouth every 8 (eight) hours as needed for nausea or vomiting 30 tablet 0    oxybutynin (DITROPAN-XL) 10 MG 24 hr tablet Take 1 tablet (10 mg total) by mouth daily at bedtime 90 tablet 3    oxybutynin (DITROPAN-XL) 5 mg 24 hr tablet TAKE 1 TABLET DAILY 90 tablet 3    potassium chloride (KLOR-CON 10) 10 mEq tablet Take 10 mEq by mouth      Syringe/Needle, Disp, (SYRINGE 3CC/25GX5/8") 25G X 5/8" 3 ML MISC by Does not apply route every 30 (thirty) days 12 each 3    valsartan-hydrochlorothiazide (DIOVAN-HCT) 160-12 5 MG per tablet Take by mouth       No current facility-administered medications for this visit        Current Outpatient Medications on File Prior to Visit   Medication Sig    albuterol (PROVENTIL HFA) 90 mcg/act inhaler Inhale    alendronate (FOSAMAX) 70 mg tablet Take by mouth    aspirin 81 MG tablet Take by mouth    atorvastatin (LIPITOR) 20 mg tablet Take 2 tablets (40 mg total) by mouth daily    B-D 3CC LUER-MARISABEL SYR 26GX5/8" 26G X 5/8" 3 ML MISC Inject under the skin every 30 (thirty) days    bumetanide (BUMEX) 1 mg tablet TAKE 1 TABLET DAILY    cholecalciferol (VITAMIN D3) 1,000 units tablet Take 2,000 Units by mouth daily      cyanocobalamin (VITAMIN B-12) 100 mcg tablet Take by mouth    cyanocobalamin 1,000 mcg/mL INJECT 1 ML (1,000 MCG TOTAL) AS DIRECTED EVERY 30 DAYS    Cyanocobalamin 1000 MCG/ML KIT Inject 1 mL (1,000 mcg total) as directed every 30 (thirty) days    ferrous sulfate 325 (65 Fe) mg tablet Take 1 tablet by mouth daily with breakfast      folic acid (FOLVITE) 204 mcg tablet Take by mouth    gabapentin (NEURONTIN) 100 mg capsule Take 2 capsules (200 mg total) by mouth 3 (three) times a day    gabapentin (NEURONTIN) 100 mg capsule TAKE 2 CAPSULES THREE TIMES A DAY    Incontinence Supply Disposable (DEPEND EASY FIT UNDERGARMENTS) MISC by Does not apply route 3 (three) times a day    ipratropium-albuterol (COMBIVENT RESPIMAT) inhaler Inhale every 6 (six) hours    KLOR-CON M10 10 MEQ tablet TAKE 1 TABLET DAILY    lidocaine (LIDODERM) 5 % Place 1 patch on the skin daily    LORazepam (ATIVAN) 1 mg tablet Take 1 tablet (1 mg total) by mouth every 8 (eight) hours as needed for anxiety    metoprolol tartrate (LOPRESSOR) 25 mg tablet TAKE 1 TABLET TWICE A DAY    nitroglycerin (NITROSTAT) 0 4 mg SL tablet Place 1 tablet (0 4 mg total) under the tongue every 5 (five) minutes as needed for chest pain    nystatin (MYCOSTATIN) powder Apply topically 3 (three) times a day    omeprazole (PriLOSEC) 40 MG capsule TAKE 1 CAPSULE DAILY    ondansetron (ZOFRAN) 8 mg tablet Take 1 tablet (8 mg total) by mouth every 8 (eight) hours as needed for nausea or vomiting    oxybutynin (DITROPAN-XL) 10 MG 24 hr tablet Take 1 tablet (10 mg total) by mouth daily at bedtime    oxybutynin (DITROPAN-XL) 5 mg 24 hr tablet TAKE 1 TABLET DAILY    potassium chloride (KLOR-CON 10) 10 mEq tablet Take 10 mEq by mouth    Syringe/Needle, Disp, (SYRINGE 3CC/25GX5/8") 25G X 5/8" 3 ML MISC by Does not apply route every 30 (thirty) days    valsartan-hydrochlorothiazide (DIOVAN-HCT) 160-12 5 MG per tablet Take by mouth    [DISCONTINUED] furosemide (LASIX) 20 mg tablet Take by mouth    [DISCONTINUED] pioglitazone (ACTOS) 15 mg tablet Take 1 tablet (15 mg total) by mouth daily    [DISCONTINUED] pioglitazone (ACTOS) 30 mg tablet TAKE 1 TABLET DAILY No current facility-administered medications on file prior to visit  She has No Known Allergies       Review of Systems   Musculoskeletal: Positive for arthralgias, back pain, gait problem and myalgias  All other systems reviewed and are negative  Objective:      /78   Pulse 61   Ht 5' 2" (1 575 m)   Wt 78 kg (172 lb)   SpO2 94%   BMI 31 46 kg/m²          Physical Exam   Constitutional: She is oriented to person, place, and time  She appears well-developed and well-nourished  Neck: Normal range of motion  Neck supple  Cardiovascular: Normal rate, regular rhythm, normal heart sounds and intact distal pulses  Pulmonary/Chest: Effort normal and breath sounds normal    Abdominal: Soft  Bowel sounds are normal    Musculoskeletal: Normal range of motion  Neurological: She is alert and oriented to person, place, and time  Skin: Skin is warm and dry  Capillary refill takes less than 2 seconds  Psychiatric: She has a normal mood and affect  Her behavior is normal  Judgment and thought content normal    Nursing note and vitals reviewed

## 2020-01-09 NOTE — PATIENT INSTRUCTIONS
Your weight is down from when I saw you over the summer  I think it is perfectly fine for you to stop your diuretic given the fact that you are having trouble ambulating and that your fluid status seems well compensated  Please weigh yourself every day and call me immediately if you gain more than 2 lb overnight  Also look out for swelling or shortness of breath    We can always restart your diuretic medication should you get symptomatic

## 2020-02-21 ENCOUNTER — OFFICE VISIT (OUTPATIENT)
Dept: FAMILY MEDICINE CLINIC | Facility: CLINIC | Age: 80
End: 2020-02-21
Payer: MEDICARE

## 2020-02-21 VITALS
BODY MASS INDEX: 31.47 KG/M2 | OXYGEN SATURATION: 94 % | HEIGHT: 62 IN | HEART RATE: 74 BPM | WEIGHT: 171 LBS | SYSTOLIC BLOOD PRESSURE: 126 MMHG | DIASTOLIC BLOOD PRESSURE: 78 MMHG

## 2020-02-21 DIAGNOSIS — M35.3 PMR (POLYMYALGIA RHEUMATICA) (HCC): ICD-10-CM

## 2020-02-21 DIAGNOSIS — I50.30 HEART FAILURE WITH PRESERVED EJECTION FRACTION, UNSPECIFIED HF CHRONICITY (HCC): ICD-10-CM

## 2020-02-21 DIAGNOSIS — J44.9 CHRONIC OBSTRUCTIVE PULMONARY DISEASE, UNSPECIFIED COPD TYPE (HCC): Primary | ICD-10-CM

## 2020-02-21 DIAGNOSIS — I27.20 PULMONARY HYPERTENSION (HCC): ICD-10-CM

## 2020-02-21 PROCEDURE — 3066F NEPHROPATHY DOC TX: CPT | Performed by: FAMILY MEDICINE

## 2020-02-21 PROCEDURE — 4040F PNEUMOC VAC/ADMIN/RCVD: CPT | Performed by: FAMILY MEDICINE

## 2020-02-21 PROCEDURE — 1160F RVW MEDS BY RX/DR IN RCRD: CPT | Performed by: FAMILY MEDICINE

## 2020-02-21 PROCEDURE — 3074F SYST BP LT 130 MM HG: CPT | Performed by: FAMILY MEDICINE

## 2020-02-21 PROCEDURE — 3008F BODY MASS INDEX DOCD: CPT | Performed by: FAMILY MEDICINE

## 2020-02-21 PROCEDURE — 2022F DILAT RTA XM EVC RTNOPTHY: CPT | Performed by: FAMILY MEDICINE

## 2020-02-21 PROCEDURE — 99214 OFFICE O/P EST MOD 30 MIN: CPT | Performed by: FAMILY MEDICINE

## 2020-02-21 PROCEDURE — 1036F TOBACCO NON-USER: CPT | Performed by: FAMILY MEDICINE

## 2020-02-21 PROCEDURE — 3078F DIAST BP <80 MM HG: CPT | Performed by: FAMILY MEDICINE

## 2020-02-21 PROCEDURE — 3044F HG A1C LEVEL LT 7.0%: CPT | Performed by: FAMILY MEDICINE

## 2020-02-21 RX ORDER — LANOLIN ALCOHOL/MO/W.PET/CERES
400 CREAM (GRAM) TOPICAL DAILY
Qty: 90 TABLET | Refills: 3 | Status: SHIPPED | OUTPATIENT
Start: 2020-02-21

## 2020-02-21 NOTE — PATIENT INSTRUCTIONS
There is a condition called poly myalgia rheumatica (PMR)  It is a fairly mysterious rheumatologic condition that affects women twice is often is men  It is almost always a condition of older people  The average age of onset is about 58years of age  It is associated with aching stiffness in the shoulder and hip joints  People often complain of having difficulty getting up from a seated position and/or brushing their hair  It is usually associated with an elevated sedimentation rate, which is a marker of inflammation or infection  The treatment for it is prednisone  Most people have to be treated for about a year before goes away  That being said, most cases will resolve on their own within 2 years  Although steroids do a great job at treating this condition, they have significant side effects  They can increase your blood sugar  They can decrease her ability to fight infection  They can worsen osteoporosis  They can irritate the stomach  They can't increased blood pressure  The decision to start long-term oral steroids should not be taken lightly  You know how you feel and you are the only 1 qualified to determine whether the risks of treatment outweigh the benefits  You are allowed to change your mind about treatment at any point in time  If I thought you were putting herself in any danger, I would certainly let you now  Things to watch out for would be a new headache around the temples (which could be on either side or both sides), sudden change in vision, and/or pain or weakness in the jaw muscles when you are chewing  If this were to happen, let us know immediately  I give people a drug holiday, or a drug-free period, from their osteoporosis medications such as alendronate if they been on it for 5 consecutive years or more  After 5 years, it is unlikely that the medication is helping and it may put you at increased risk for side effects

## 2020-02-21 NOTE — PROGRESS NOTES
Assessment/Plan:    No problem-specific Assessment & Plan notes found for this encounter  Diagnoses and all orders for this visit:    Chronic obstructive pulmonary disease, unspecified COPD type ([facility] )  -     folic acid (FOLVITE) 498 mcg tablet; Take 1 tablet (400 mcg total) by mouth daily  -     Cholecalciferol (D3-1000) 25 MCG (1000 UT) tablet; Take 2 tablets (2,000 Units total) by mouth daily    Heart failure with preserved ejection fraction, unspecified HF chronicity (HCC)  -     folic acid (FOLVITE) 491 mcg tablet; Take 1 tablet (400 mcg total) by mouth daily  -     Cholecalciferol (D3-1000) 25 MCG (1000 UT) tablet; Take 2 tablets (2,000 Units total) by mouth daily    Pulmonary hypertension (HCC)  -     folic acid (FOLVITE) 011 mcg tablet; Take 1 tablet (400 mcg total) by mouth daily  -     Cholecalciferol (D3-1000) 25 MCG (1000 UT) tablet; Take 2 tablets (2,000 Units total) by mouth daily    PMR (polymyalgia rheumatica) ([facility])          Subjective:      Patient ID: Sada Mahajan is a [de-identified] y o  female  She has aching stiffness at the shoulders and hips  She has difficulty getting out of a chair and raising her arms above shoulder level  She has an elevated sed rate but normal CBC, CMP, and TSH  I discussed the potential diagnosis of PMR with her and her   I discussed a trial of prednisone  She and her  declined  I discussed the risks of temporal arteritis as well as the warning signs  She and her  expressed understanding  They are not interested in starting prednisone at th time  She is feeling well with physical therapy that she is involved in  She does not feel that the benefits of prednisone outweigh the risks of side effects  The following portions of the patient's history were reviewed and updated as appropriate:   She  has a past medical history of Delirium (8/19/2017)    She   Patient Active Problem List    Diagnosis Date Noted    PMR (polymyalgia rheumatica) ([facility] ) 02/21/2020    Gastroenteritis and colitis, viral 10/03/2019    Encounter for diabetic foot exam ([facility] ) 06/10/2019    BMI 35 0-35 9,adult 06/10/2019    Medicare annual wellness visit, subsequent 06/10/2019    Painless hematuria 06/19/2018    Other hyperlipidemia 06/19/2018    Tendinitis of right rotator cuff 02/02/2018    Anemia, pernicious 10/27/2017    Abnormal chest xray 10/03/2017    Diarrhea 10/03/2017    Skin rash 09/27/2017    Sciatica of right side 09/22/2017    Urinary incontinence 09/22/2017    Gastrointestinal hemorrhage associated with duodenal ulcer 08/24/2017    Delirium 08/19/2017    Hypernatremia 08/19/2017    Severe malnutrition ([facility] ) 08/16/2017    Acute blood loss anemia 08/15/2017    Acute respiratory failure ([facility] ) 08/15/2017    ANGEL (acute kidney injury) ([facility] ) 08/15/2017    Shock ([facility] ) 08/15/2017    (HFpEF) heart failure with preserved ejection fraction ([facility] ) 08/02/2017    Central sleep apnea 08/02/2017    COPD (chronic obstructive pulmonary disease) ([facility] ) 08/02/2017    Pulmonary hypertension ([facility] ) 08/02/2017    Acute respiratory failure with hypoxia ([facility] ) 07/23/2017    CAD (coronary artery disease) 07/23/2017    Heart failure ([facility] ) 07/23/2017    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    Acute lower UTI 07/21/2017    Mental disorder 06/20/2017    Fibromyalgia 06/20/2017    Hypertension 06/20/2017    Osteoporosis 06/20/2017    Shortness of breath 06/20/2017     She  has a past surgical history that includes Cardiac surgery  Her family history includes Cancer in her daughter and other; Diabetes in her mother and other; Heart disease in her brother; Hypertension in her mother and other; Lung disease in her other and other; Osteoporosis in her other; Stroke in her father; Thyroid disease in her son  She  reports that she has quit smoking  She has never used smokeless tobacco  She reports that she does not drink alcohol or use drugs    Current Outpatient Medications   Medication Sig Dispense Refill    aspirin 81 MG tablet Take by mouth      atorvastatin (LIPITOR) 20 mg tablet Take 2 tablets (40 mg total) by mouth daily 180 tablet 3    B-D 3CC LUER-MARISABEL SYR 26GX5/8" 26G X 5/8" 3 ML MISC Inject under the skin every 30 (thirty) days 4 each 0    bumetanide (BUMEX) 1 mg tablet TAKE 1 TABLET DAILY 90 tablet 4    cyanocobalamin (VITAMIN B-12) 100 mcg tablet Take by mouth      Cyanocobalamin 1000 MCG/ML KIT Inject 1 mL (1,000 mcg total) as directed every 30 (thirty) days 10 mL 3    ferrous sulfate 325 (65 Fe) mg tablet Take 1 tablet by mouth daily with breakfast        folic acid (FOLVITE) 687 mcg tablet Take 1 tablet (400 mcg total) by mouth daily 90 tablet 3    gabapentin (NEURONTIN) 100 mg capsule Take 2 capsules (200 mg total) by mouth 3 (three) times a day 540 capsule 3    Incontinence Supply Disposable (DEPEND EASY FIT UNDERGARMENTS) MISC by Does not apply route 3 (three) times a day 270 each 3    ipratropium-albuterol (COMBIVENT RESPIMAT) inhaler Inhale every 6 (six) hours      KLOR-CON M10 10 MEQ tablet TAKE 1 TABLET DAILY 90 tablet 4    lidocaine (LIDODERM) 5 % Place 1 patch on the skin daily 90 patch 3    LORazepam (ATIVAN) 1 mg tablet Take 1 tablet (1 mg total) by mouth every 8 (eight) hours as needed for anxiety 60 tablet 0    metoprolol tartrate (LOPRESSOR) 25 mg tablet TAKE 1 TABLET TWICE A  tablet 4    nitroglycerin (NITROSTAT) 0 4 mg SL tablet Place 1 tablet (0 4 mg total) under the tongue every 5 (five) minutes as needed for chest pain 100 tablet 0    nystatin (MYCOSTATIN) powder Apply topically 3 (three) times a day      omeprazole (PriLOSEC) 40 MG capsule TAKE 1 CAPSULE DAILY 90 capsule 4    ondansetron (ZOFRAN) 8 mg tablet Take 1 tablet (8 mg total) by mouth every 8 (eight) hours as needed for nausea or vomiting 30 tablet 0    oxybutynin (DITROPAN-XL) 10 MG 24 hr tablet Take 1 tablet (10 mg total) by mouth daily at bedtime 90 tablet 3    Syringe/Needle, Disp, (SYRINGE 3CC/25GX5/8") 25G X 5/8" 3 ML MISC by Does not apply route every 30 (thirty) days 12 each 3    valsartan-hydrochlorothiazide (DIOVAN-HCT) 160-12 5 MG per tablet Take by mouth      albuterol (PROVENTIL HFA) 90 mcg/act inhaler Inhale      alendronate (FOSAMAX) 70 mg tablet Take by mouth      Cholecalciferol (D3-1000) 25 MCG (1000 UT) tablet Take 2 tablets (2,000 Units total) by mouth daily 90 tablet 3    oxybutynin (DITROPAN-XL) 5 mg 24 hr tablet TAKE 1 TABLET DAILY (Patient not taking: Reported on 2/21/2020) 90 tablet 3    potassium chloride (KLOR-CON 10) 10 mEq tablet Take 10 mEq by mouth       No current facility-administered medications for this visit        Current Outpatient Medications on File Prior to Visit   Medication Sig    aspirin 81 MG tablet Take by mouth    atorvastatin (LIPITOR) 20 mg tablet Take 2 tablets (40 mg total) by mouth daily    B-D 3CC LUER-MARISABEL SYR 26GX5/8" 26G X 5/8" 3 ML MISC Inject under the skin every 30 (thirty) days    bumetanide (BUMEX) 1 mg tablet TAKE 1 TABLET DAILY    cyanocobalamin (VITAMIN B-12) 100 mcg tablet Take by mouth    Cyanocobalamin 1000 MCG/ML KIT Inject 1 mL (1,000 mcg total) as directed every 30 (thirty) days    ferrous sulfate 325 (65 Fe) mg tablet Take 1 tablet by mouth daily with breakfast      gabapentin (NEURONTIN) 100 mg capsule Take 2 capsules (200 mg total) by mouth 3 (three) times a day    Incontinence Supply Disposable (DEPEND EASY FIT UNDERGARMENTS) MISC by Does not apply route 3 (three) times a day    ipratropium-albuterol (COMBIVENT RESPIMAT) inhaler Inhale every 6 (six) hours    KLOR-CON M10 10 MEQ tablet TAKE 1 TABLET DAILY    lidocaine (LIDODERM) 5 % Place 1 patch on the skin daily    LORazepam (ATIVAN) 1 mg tablet Take 1 tablet (1 mg total) by mouth every 8 (eight) hours as needed for anxiety    metoprolol tartrate (LOPRESSOR) 25 mg tablet TAKE 1 TABLET TWICE A DAY    nitroglycerin (NITROSTAT) 0 4 mg SL tablet Place 1 tablet (0 4 mg total) under the tongue every 5 (five) minutes as needed for chest pain    nystatin (MYCOSTATIN) powder Apply topically 3 (three) times a day    omeprazole (PriLOSEC) 40 MG capsule TAKE 1 CAPSULE DAILY    ondansetron (ZOFRAN) 8 mg tablet Take 1 tablet (8 mg total) by mouth every 8 (eight) hours as needed for nausea or vomiting    oxybutynin (DITROPAN-XL) 10 MG 24 hr tablet Take 1 tablet (10 mg total) by mouth daily at bedtime    Syringe/Needle, Disp, (SYRINGE 3CC/25GX5/8") 25G X 5/8" 3 ML MISC by Does not apply route every 30 (thirty) days    valsartan-hydrochlorothiazide (DIOVAN-HCT) 160-12 5 MG per tablet Take by mouth    [DISCONTINUED] cholecalciferol (VITAMIN D3) 1,000 units tablet Take 2,000 Units by mouth daily      [DISCONTINUED] folic acid (FOLVITE) 444 mcg tablet Take by mouth    albuterol (PROVENTIL HFA) 90 mcg/act inhaler Inhale    alendronate (FOSAMAX) 70 mg tablet Take by mouth    oxybutynin (DITROPAN-XL) 5 mg 24 hr tablet TAKE 1 TABLET DAILY (Patient not taking: Reported on 2/21/2020)    potassium chloride (KLOR-CON 10) 10 mEq tablet Take 10 mEq by mouth    [DISCONTINUED] cyanocobalamin 1,000 mcg/mL INJECT 1 ML (1,000 MCG TOTAL) AS DIRECTED EVERY 30 DAYS (Patient not taking: Reported on 2/21/2020)    [DISCONTINUED] gabapentin (NEURONTIN) 100 mg capsule TAKE 2 CAPSULES THREE TIMES A DAY (Patient not taking: Reported on 2/21/2020)     No current facility-administered medications on file prior to visit  She has No Known Allergies       Review of Systems   All other systems reviewed and are negative  Objective:      /78   Pulse 74   Ht 5' 2" (1 575 m)   Wt 77 6 kg (171 lb)   SpO2 94%   BMI 31 28 kg/m²          Physical Exam   Constitutional: She is oriented to person, place, and time  She appears well-developed and well-nourished  Neck: Normal range of motion  Neck supple     Cardiovascular: Normal rate, regular rhythm, normal heart sounds and intact distal pulses  Pulmonary/Chest: Effort normal and breath sounds normal    Abdominal: Soft  Bowel sounds are normal    Musculoskeletal: Normal range of motion  Neurological: She is alert and oriented to person, place, and time  Skin: Skin is warm and dry  Capillary refill takes less than 2 seconds  Psychiatric: She has a normal mood and affect  Her behavior is normal  Judgment and thought content normal    Nursing note and vitals reviewed

## 2020-02-21 NOTE — ASSESSMENT & PLAN NOTE
I discussed the diagnosis, workup, and natural history of PMR  I discussed the warning signs of temporal arteritis  I discussed the rationale of treating with prednisone  I discussed the potential side effects, risks, and benefits of this treatment  She and her  have declined prednisone at this time  They know that they can call at any point time she is not feeling well and we can revisit the issue  I spent greater than 45 minutes counseling the patient and her

## 2020-03-15 DIAGNOSIS — I10 ESSENTIAL HYPERTENSION: ICD-10-CM

## 2020-03-15 DIAGNOSIS — N32.81 OAB (OVERACTIVE BLADDER): ICD-10-CM

## 2020-03-16 RX ORDER — OXYBUTYNIN CHLORIDE 10 MG/1
TABLET, EXTENDED RELEASE ORAL
Qty: 90 TABLET | Refills: 3 | Status: SHIPPED | OUTPATIENT
Start: 2020-03-16 | End: 2021-12-20

## 2020-03-16 RX ORDER — ATORVASTATIN CALCIUM 20 MG/1
TABLET, FILM COATED ORAL
Qty: 180 TABLET | Refills: 3 | Status: SHIPPED | OUTPATIENT
Start: 2020-03-16 | End: 2021-05-03

## 2020-03-23 ENCOUNTER — TELEPHONE (OUTPATIENT)
Dept: FAMILY MEDICINE CLINIC | Facility: CLINIC | Age: 80
End: 2020-03-23

## 2020-03-23 DIAGNOSIS — N39.0 URINARY TRACT INFECTION WITHOUT HEMATURIA, SITE UNSPECIFIED: Primary | ICD-10-CM

## 2020-03-23 RX ORDER — NITROFURANTOIN 25; 75 MG/1; MG/1
100 CAPSULE ORAL 2 TIMES DAILY
Qty: 20 CAPSULE | Refills: 0 | Status: SHIPPED | OUTPATIENT
Start: 2020-03-23 | End: 2020-04-02

## 2020-03-23 NOTE — TELEPHONE ENCOUNTER
Please notify patient I escribed a Rx Macrobid 100 mg si cap po 2 x daily x 10 days #20 #0ref escribed to [address]  Instruct patient to increase her water intake and Cranberry Juice daily

## 2020-04-10 ENCOUNTER — TELEMEDICINE (OUTPATIENT)
Dept: FAMILY MEDICINE CLINIC | Facility: CLINIC | Age: 80
End: 2020-04-10

## 2020-04-10 DIAGNOSIS — I50.30 (HFPEF) HEART FAILURE WITH PRESERVED EJECTION FRACTION (HCC): ICD-10-CM

## 2020-04-10 DIAGNOSIS — Z09 FOLLOW-UP EXAMINATION: Primary | ICD-10-CM

## 2020-04-10 DIAGNOSIS — M35.3 PMR (POLYMYALGIA RHEUMATICA) (HCC): ICD-10-CM

## 2020-04-10 PROCEDURE — 99442 PR PHYS/QHP TELEPHONE EVALUATION 11-20 MIN: CPT | Performed by: NURSE PRACTITIONER

## 2020-04-10 RX ORDER — NITROGLYCERIN 0.4 MG/1
0.4 TABLET SUBLINGUAL
Qty: 100 TABLET | Refills: 0 | Status: SHIPPED | OUTPATIENT
Start: 2020-04-10 | End: 2022-07-28 | Stop reason: SDUPTHER

## 2020-05-26 ENCOUNTER — TELEPHONE (OUTPATIENT)
Dept: FAMILY MEDICINE CLINIC | Facility: CLINIC | Age: 80
End: 2020-05-26

## 2020-05-26 DIAGNOSIS — N30.00 ACUTE CYSTITIS WITHOUT HEMATURIA: Primary | ICD-10-CM

## 2020-05-26 RX ORDER — NITROFURANTOIN 25; 75 MG/1; MG/1
100 CAPSULE ORAL 2 TIMES DAILY
Qty: 10 CAPSULE | Refills: 0 | Status: SHIPPED | OUTPATIENT
Start: 2020-05-26 | End: 2020-05-31

## 2020-06-09 DIAGNOSIS — E53.8 B12 DEFICIENCY: ICD-10-CM

## 2020-06-12 DIAGNOSIS — N30.00 ACUTE CYSTITIS WITHOUT HEMATURIA: Primary | ICD-10-CM

## 2020-06-12 RX ORDER — SULFAMETHOXAZOLE AND TRIMETHOPRIM 800; 160 MG/1; MG/1
1 TABLET ORAL EVERY 12 HOURS SCHEDULED
Qty: 14 TABLET | Refills: 0 | Status: SHIPPED | OUTPATIENT
Start: 2020-06-12 | End: 2020-06-19

## 2020-06-25 DIAGNOSIS — E11.9 TYPE 2 DIABETES MELLITUS WITHOUT COMPLICATION, WITHOUT LONG-TERM CURRENT USE OF INSULIN (HCC): ICD-10-CM

## 2020-06-29 RX ORDER — GABAPENTIN 100 MG/1
CAPSULE ORAL
Qty: 270 CAPSULE | Refills: 7 | Status: SHIPPED | OUTPATIENT
Start: 2020-06-29 | End: 2021-08-05

## 2020-07-31 ENCOUNTER — OFFICE VISIT (OUTPATIENT)
Dept: FAMILY MEDICINE CLINIC | Facility: CLINIC | Age: 80
End: 2020-07-31
Payer: MEDICARE

## 2020-07-31 VITALS
HEART RATE: 57 BPM | OXYGEN SATURATION: 94 % | SYSTOLIC BLOOD PRESSURE: 112 MMHG | BODY MASS INDEX: 32.83 KG/M2 | DIASTOLIC BLOOD PRESSURE: 86 MMHG | HEIGHT: 62 IN | TEMPERATURE: 97 F | WEIGHT: 178.4 LBS

## 2020-07-31 DIAGNOSIS — E66.09 CLASS 1 OBESITY DUE TO EXCESS CALORIES WITH SERIOUS COMORBIDITY AND BODY MASS INDEX (BMI) OF 31.0 TO 31.9 IN ADULT: ICD-10-CM

## 2020-07-31 DIAGNOSIS — Z00.00 MEDICARE ANNUAL WELLNESS VISIT, SUBSEQUENT: Primary | ICD-10-CM

## 2020-07-31 DIAGNOSIS — N39.46 MIXED STRESS AND URGE URINARY INCONTINENCE: ICD-10-CM

## 2020-07-31 DIAGNOSIS — I50.30 (HFPEF) HEART FAILURE WITH PRESERVED EJECTION FRACTION (HCC): ICD-10-CM

## 2020-07-31 PROBLEM — E66.811 CLASS 1 OBESITY DUE TO EXCESS CALORIES WITH SERIOUS COMORBIDITY AND BODY MASS INDEX (BMI) OF 31.0 TO 31.9 IN ADULT: Status: ACTIVE | Noted: 2019-06-10

## 2020-07-31 PROCEDURE — 2022F DILAT RTA XM EVC RTNOPTHY: CPT | Performed by: FAMILY MEDICINE

## 2020-07-31 PROCEDURE — 1125F AMNT PAIN NOTED PAIN PRSNT: CPT | Performed by: FAMILY MEDICINE

## 2020-07-31 PROCEDURE — G0439 PPPS, SUBSEQ VISIT: HCPCS | Performed by: FAMILY MEDICINE

## 2020-07-31 PROCEDURE — 3066F NEPHROPATHY DOC TX: CPT | Performed by: FAMILY MEDICINE

## 2020-07-31 PROCEDURE — 3079F DIAST BP 80-89 MM HG: CPT | Performed by: FAMILY MEDICINE

## 2020-07-31 PROCEDURE — 3074F SYST BP LT 130 MM HG: CPT | Performed by: FAMILY MEDICINE

## 2020-07-31 PROCEDURE — 1170F FXNL STATUS ASSESSED: CPT | Performed by: FAMILY MEDICINE

## 2020-07-31 PROCEDURE — 4040F PNEUMOC VAC/ADMIN/RCVD: CPT | Performed by: FAMILY MEDICINE

## 2020-07-31 PROCEDURE — 1160F RVW MEDS BY RX/DR IN RCRD: CPT | Performed by: FAMILY MEDICINE

## 2020-07-31 PROCEDURE — 3044F HG A1C LEVEL LT 7.0%: CPT | Performed by: FAMILY MEDICINE

## 2020-07-31 PROCEDURE — 1036F TOBACCO NON-USER: CPT | Performed by: FAMILY MEDICINE

## 2020-07-31 RX ORDER — BUMETANIDE 1 MG/1
1 TABLET ORAL DAILY
Qty: 90 TABLET | Refills: 4 | Status: SHIPPED | OUTPATIENT
Start: 2020-07-31 | End: 2021-02-24 | Stop reason: SDUPTHER

## 2020-07-31 NOTE — PATIENT INSTRUCTIONS
Medicare Preventive Visit Patient Instructions  Thank you for completing your Welcome to Medicare Visit or Medicare Annual Wellness Visit today  Your next wellness visit will be due in one year (7/31/2021)  The screening/preventive services that you may require over the next 5-10 years are detailed below  Some tests may not apply to you based off risk factors and/or age  Screening tests ordered at today's visit but not completed yet may show as past due  Also, please note that scanned in results may not display below  Preventive Screenings:  Service Recommendations Previous Testing/Comments   Colorectal Cancer Screening  * Colonoscopy    * Fecal Occult Blood Test (FOBT)/Fecal Immunochemical Test (FIT)  * Fecal DNA/Cologuard Test  * Flexible Sigmoidoscopy Age: 54-65 years old   Colonoscopy: every 10 years (may be performed more frequently if at higher risk)  OR  FOBT/FIT: every 1 year  OR  Cologuard: every 3 years  OR  Sigmoidoscopy: every 5 years  Screening may be recommended earlier than age 48 if at higher risk for colorectal cancer  Also, an individualized decision between you and your healthcare provider will decide whether screening between the ages of 74-80 would be appropriate  Colonoscopy: Not on file  FOBT/FIT: Not on file  Cologuard: Not on file  Sigmoidoscopy: Not on file         Breast Cancer Screening Age: 36 years old  Frequency: every 1-2 years  Not required if history of left and right mastectomy Mammogram: Not on file       Cervical Cancer Screening Between the ages of 21-29, pap smear recommended once every 3 years  Between the ages of 33-67, can perform pap smear with HPV co-testing every 5 years     Recommendations may differ for women with a history of total hysterectomy, cervical cancer, or abnormal pap smears in past  Pap Smear: Not on file    Screening Not Indicated   Hepatitis C Screening Once for adults born between [facility]  More frequently in patients at high risk for Hepatitis C Hep C Antibody: Not on file       Diabetes Screening 1-2 times per year if you're at risk for diabetes or have pre-diabetes Fasting glucose: No results in last 5 years   A1C: 5 4    Screening Not Indicated  History Diabetes   Cholesterol Screening Once every 5 years if you don't have a lipid disorder  May order more often based on risk factors  Lipid panel: Not on file    Screening Not Indicated  History Lipid Disorder     Other Preventive Screenings Covered by Medicare:  1  Abdominal Aortic Aneurysm (AAA) Screening: covered once if your at risk  You're considered to be at risk if you have a family history of AAA  2  Lung Cancer Screening: covers low dose CT scan once per year if you meet all of the following conditions: (1) Age 50-69; (2) No signs or symptoms of lung cancer; (3) Current smoker or have quit smoking within the last 15 years; (4) You have a tobacco smoking history of at least 30 pack years (packs per day multiplied by number of years you smoked); (5) You get a written order from a healthcare provider  3  Glaucoma Screening: covered annually if you're considered high risk: (1) You have diabetes OR (2) Family history of glaucoma OR (3)  aged 48 and older OR (3)  American aged 72 and older  3  Osteoporosis Screening: covered every 2 years if you meet one of the following conditions: (1) You're estrogen deficient and at risk for osteoporosis based off medical history and other findings; (2) Have a vertebral abnormality; (3) On glucocorticoid therapy for more than 3 months; (4) Have primary hyperparathyroidism; (5) On osteoporosis medications and need to assess response to drug therapy  · Last bone density test (DXA Scan): Not on file  5  HIV Screening: covered annually if you're between the age of 12-76  Also covered annually if you are younger than 13 and older than 72 with risk factors for HIV infection   For pregnant patients, it is covered up to 3 times per pregnancy  Immunizations:  Immunization Recommendations   Influenza Vaccine Annual influenza vaccination during flu season is recommended for all persons aged >= 6 months who do not have contraindications   Pneumococcal Vaccine (Prevnar and Pneumovax)  * Prevnar = PCV13  * Pneumovax = PPSV23   Adults 25-60 years old: 1-3 doses may be recommended based on certain risk factors  Adults 72 years old: Prevnar (PCV13) vaccine recommended followed by Pneumovax (PPSV23) vaccine  If already received PPSV23 since turning 65, then PCV13 recommended at least one year after PPSV23 dose  Hepatitis B Vaccine 3 dose series if at intermediate or high risk (ex: diabetes, end stage renal disease, liver disease)   Tetanus (Td) Vaccine - COST NOT COVERED BY MEDICARE PART B Following completion of primary series, a booster dose should be given every 10 years to maintain immunity against tetanus  Td may also be given as tetanus wound prophylaxis  Tdap Vaccine - COST NOT COVERED BY MEDICARE PART B Recommended at least once for all adults  For pregnant patients, recommended with each pregnancy  Shingles Vaccine (Shingrix) - COST NOT COVERED BY MEDICARE PART B  2 shot series recommended in those aged 48 and above     Health Maintenance Due:  There are no preventive care reminders to display for this patient  Immunizations Due:      Topic Date Due    DTaP,Tdap,and Td Vaccines (1 - Tdap) 09/14/1951    Influenza Vaccine  07/01/2020     Advance Directives   What are advance directives? Advance directives are legal documents that state your wishes and plans for medical care  These plans are made ahead of time in case you lose your ability to make decisions for yourself  Advance directives can apply to any medical decision, such as the treatments you want, and if you want to donate organs  What are the types of advance directives? There are many types of advance directives, and each state has rules about how to use them   You may choose a combination of any of the following:  · Living will: This is a written record of the treatment you want  You can also choose which treatments you do not want, which to limit, and which to stop at a certain time  This includes surgery, medicine, IV fluid, and tube feedings  · Durable power of  for healthcare [facility]): This is a written record that states who you want to make healthcare choices for you when you are unable to make them for yourself  This person, called a proxy, is usually a family member or a friend  You may choose more than 1 proxy  · Do not resuscitate (DNR) order:  A DNR order is used in case your heart stops beating or you stop breathing  It is a request not to have certain forms of treatment, such as CPR  A DNR order may be included in other types of advance directives  · Medical directive: This covers the care that you want if you are in a coma, near death, or unable to make decisions for yourself  You can list the treatments you want for each condition  Treatment may include pain medicine, surgery, blood transfusions, dialysis, IV or tube feedings, and a ventilator (breathing machine)  · Values history: This document has questions about your views, beliefs, and how you feel and think about life  This information can help others choose the care that you would choose  Why are advance directives important? An advance directive helps you control your care  Although spoken wishes may be used, it is better to have your wishes written down  Spoken wishes can be misunderstood, or not followed  Treatments may be given even if you do not want them  An advance directive may make it easier for your family to make difficult choices about your care  Weight Management   Why it is important to manage your weight:  Being overweight increases your risk of health conditions such as heart disease, high blood pressure, type 2 diabetes, and certain types of cancer   It can also increase your risk for osteoarthritis, sleep apnea, and other respiratory problems  Aim for a slow, steady weight loss  Even a small amount of weight loss can lower your risk of health problems  How to lose weight safely:  A safe and healthy way to lose weight is to eat fewer calories and get regular exercise  You can lose up about 1 pound a week by decreasing the number of calories you eat by 500 calories each day  Healthy meal plan for weight management:  A healthy meal plan includes a variety of foods, contains fewer calories, and helps you stay healthy  A healthy meal plan includes the following:  · Eat whole-grain foods more often  A healthy meal plan should contain fiber  Fiber is the part of grains, fruits, and vegetables that is not broken down by your body  Whole-grain foods are healthy and provide extra fiber in your diet  Some examples of whole-grain foods are whole-wheat breads and pastas, oatmeal, brown rice, and bulgur  · Eat a variety of vegetables every day  Include dark, leafy greens such as spinach, kale, flakito greens, and mustard greens  Eat yellow and orange vegetables such as carrots, sweet potatoes, and winter squash  · Eat a variety of fruits every day  Choose fresh or canned fruit (canned in its own juice or light syrup) instead of juice  Fruit juice has very little or no fiber  · Eat low-fat dairy foods  Drink fat-free (skim) milk or 1% milk  Eat fat-free yogurt and low-fat cottage cheese  Try low-fat cheeses such as mozzarella and other reduced-fat cheeses  · Choose meat and other protein foods that are low in fat  Choose beans or other legumes such as split peas or lentils  Choose fish, skinless poultry (chicken or turkey), or lean cuts of red meat (beef or pork)  Before you cook meat or poultry, cut off any visible fat  · Use less fat and oil  Try baking foods instead of frying them  Add less fat, such as margarine, sour cream, regular salad dressing and mayonnaise to foods   Eat fewer high-fat foods  Some examples of high-fat foods include french fries, doughnuts, ice cream, and cakes  · Eat fewer sweets  Limit foods and drinks that are high in sugar  This includes candy, cookies, regular soda, and sweetened drinks  Exercise:  Exercise at least 30 minutes per day on most days of the week  Some examples of exercise include walking, biking, dancing, and swimming  You can also fit in more physical activity by taking the stairs instead of the elevator or parking farther away from stores  Ask your healthcare provider about the best exercise plan for you  © Copyright [employer] 2018 Information is for End User's use only and may not be sold, redistributed or otherwise used for commercial purposes  All illustrations and images included in CareNotes® are the copyrighted property of [employer]  or [facility] Preventive Visit Patient Instructions  Thank you for completing your Welcome to Medicare Visit or Medicare Annual Wellness Visit today  Your next wellness visit will be due in one year (7/31/2021)  The screening/preventive services that you may require over the next 5-10 years are detailed below  Some tests may not apply to you based off risk factors and/or age  Screening tests ordered at today's visit but not completed yet may show as past due  Also, please note that scanned in results may not display below  Preventive Screenings:  Service Recommendations Previous Testing/Comments   Colorectal Cancer Screening  * Colonoscopy    * Fecal Occult Blood Test (FOBT)/Fecal Immunochemical Test (FIT)  * Fecal DNA/Cologuard Test  * Flexible Sigmoidoscopy Age: 54-65 years old   Colonoscopy: every 10 years (may be performed more frequently if at higher risk)  OR  FOBT/FIT: every 1 year  OR  Cologuard: every 3 years  OR  Sigmoidoscopy: every 5 years  Screening may be recommended earlier than age 48 if at higher risk for colorectal cancer   Also, an individualized decision between you and your healthcare provider will decide whether screening between the ages of 74-80 would be appropriate  Colonoscopy: Not on file  FOBT/FIT: Not on file  Cologuard: Not on file  Sigmoidoscopy: Not on file    Screening Not Indicated     Breast Cancer Screening Age: 36 years old  Frequency: every 1-2 years  Not required if history of left and right mastectomy Mammogram: Not on file    Screening Not Indicated   Cervical Cancer Screening Between the ages of 21-29, pap smear recommended once every 3 years  Between the ages of 33-67, can perform pap smear with HPV co-testing every 5 years  Recommendations may differ for women with a history of total hysterectomy, cervical cancer, or abnormal pap smears in past  Pap Smear: Not on file    Screening Not Indicated  Screening Not Indicated   Hepatitis C Screening Once for adults born between 1945 and 1965  More frequently in patients at high risk for Hepatitis C Hep C Antibody: Not on file    Screening Not Indicated   Diabetes Screening 1-2 times per year if you're at risk for diabetes or have pre-diabetes Fasting glucose: No results in last 5 years   A1C: 5 4    Screening Not Indicated  History Diabetes  Screening Not Indicated  History Diabetes   Cholesterol Screening Once every 5 years if you don't have a lipid disorder  May order more often based on risk factors  Lipid panel: Not on file    Screening Not Indicated  History Lipid Disorder  Screening Not Indicated  History Lipid Disorder     Other Preventive Screenings Covered by Medicare:  6  Abdominal Aortic Aneurysm (AAA) Screening: covered once if your at risk  You're considered to be at risk if you have a family history of AAA    7  Lung Cancer Screening: covers low dose CT scan once per year if you meet all of the following conditions: (1) Age 50-69; (2) No signs or symptoms of lung cancer; (3) Current smoker or have quit smoking within the last 15 years; (4) You have a tobacco smoking history of at least 30 pack years (packs per day multiplied by number of years you smoked); (5) You get a written order from a healthcare provider  8  Glaucoma Screening: covered annually if you're considered high risk: (1) You have diabetes OR (2) Family history of glaucoma OR (3)  aged 48 and older OR (3)  American aged 72 and older  5  Osteoporosis Screening: covered every 2 years if you meet one of the following conditions: (1) You're estrogen deficient and at risk for osteoporosis based off medical history and other findings; (2) Have a vertebral abnormality; (3) On glucocorticoid therapy for more than 3 months; (4) Have primary hyperparathyroidism; (5) On osteoporosis medications and need to assess response to drug therapy  · Last bone density test (DXA Scan): Not on file  10  HIV Screening: covered annually if you're between the age of 12-76  Also covered annually if you are younger than 13 and older than 72 with risk factors for HIV infection  For pregnant patients, it is covered up to 3 times per pregnancy  Immunizations:  Immunization Recommendations   Influenza Vaccine Annual influenza vaccination during flu season is recommended for all persons aged >= 6 months who do not have contraindications   Pneumococcal Vaccine (Prevnar and Pneumovax)  * Prevnar = PCV13  * Pneumovax = PPSV23   Adults 25-60 years old: 1-3 doses may be recommended based on certain risk factors  Adults 72 years old: Prevnar (PCV13) vaccine recommended followed by Pneumovax (PPSV23) vaccine  If already received PPSV23 since turning 65, then PCV13 recommended at least one year after PPSV23 dose  Hepatitis B Vaccine 3 dose series if at intermediate or high risk (ex: diabetes, end stage renal disease, liver disease)   Tetanus (Td) Vaccine - COST NOT COVERED BY MEDICARE PART B Following completion of primary series, a booster dose should be given every 10 years to maintain immunity against tetanus   Td may also be given as tetanus wound prophylaxis  Tdap Vaccine - COST NOT COVERED BY MEDICARE PART B Recommended at least once for all adults  For pregnant patients, recommended with each pregnancy  Shingles Vaccine (Shingrix) - COST NOT COVERED BY MEDICARE PART B  2 shot series recommended in those aged 48 and above     Health Maintenance Due:  There are no preventive care reminders to display for this patient  Immunizations Due:      Topic Date Due    DTaP,Tdap,and Td Vaccines (1 - Tdap) 09[birth year]/1950    Influenza Vaccine  07/01/2020     Advance Directives   What are advance directives? Advance directives are legal documents that state your wishes and plans for medical care  These plans are made ahead of time in case you lose your ability to make decisions for yourself  Advance directives can apply to any medical decision, such as the treatments you want, and if you want to donate organs  What are the types of advance directives? There are many types of advance directives, and each state has rules about how to use them  You may choose a combination of any of the following:  · Living will: This is a written record of the treatment you want  You can also choose which treatments you do not want, which to limit, and which to stop at a certain time  This includes surgery, medicine, IV fluid, and tube feedings  · Durable power of  for healthcare [facility]): This is a written record that states who you want to make healthcare choices for you when you are unable to make them for yourself  This person, called a proxy, is usually a family member or a friend  You may choose more than 1 proxy  · Do not resuscitate (DNR) order:  A DNR order is used in case your heart stops beating or you stop breathing  It is a request not to have certain forms of treatment, such as CPR  A DNR order may be included in other types of advance directives  · Medical directive:   This covers the care that you want if you are in a coma, near death, or unable to make decisions for yourself  You can list the treatments you want for each condition  Treatment may include pain medicine, surgery, blood transfusions, dialysis, IV or tube feedings, and a ventilator (breathing machine)  · Values history: This document has questions about your views, beliefs, and how you feel and think about life  This information can help others choose the care that you would choose  Why are advance directives important? An advance directive helps you control your care  Although spoken wishes may be used, it is better to have your wishes written down  Spoken wishes can be misunderstood, or not followed  Treatments may be given even if you do not want them  An advance directive may make it easier for your family to make difficult choices about your care  Fall Prevention    Fall prevention  includes ways to make your home and other areas safer  It also includes ways you can move more carefully to prevent a fall  Health conditions that cause changes in your blood pressure, vision, or muscle strength and coordination may increase your risk for falls  Medicines may also increase your risk for falls if they make you dizzy, weak, or sleepy  Fall prevention tips:   · Stand or sit up slowly  · Use assistive devices as directed  · Wear shoes that fit well and have soles that   · Wear a personal alarm  · Stay active  · Manage your medical conditions  Home Safety Tips:  · Add items to prevent falls in the bathroom  · Keep paths clear  · Install bright lights in your home  · Keep items you use often on shelves within reach  · Paint or place reflective tape on the edges of your stairs  Urinary Incontinence   Urinary incontinence (UI)  is when you lose control of your bladder  UI develops because your bladder cannot store or empty urine properly  The 3 most common types of UI are stress incontinence, urge incontinence, or both    Medicines:   · May be given to help strengthen your bladder control  Report any side effects of medication to your healthcare provider  Do pelvic muscle exercises often:  Your pelvic muscles help you stop urinating  Squeeze these muscles tight for 5 seconds, then relax for 5 seconds  Gradually work up to squeezing for 10 seconds  Do 3 sets of 15 repetitions a day, or as directed  This will help strengthen your pelvic muscles and improve bladder control  Train your bladder:  Go to the bathroom at set times, such as every 2 hours, even if you do not feel the urge to go  You can also try to hold your urine when you feel the urge to go  For example, hold your urine for 5 minutes when you feel the urge to go  As that becomes easier, hold your urine for 10 minutes  Self-care:   · Keep a UI record  Write down how often you leak urine and how much you leak  Make a note of what you were doing when you leaked urine  · Drink liquids as directed  You may need to limit the amount of liquid you drink to help control your urine leakage  Do not drink any liquid right before you go to bed  Limit or do not have drinks that contain caffeine or alcohol  · Prevent constipation  Eat a variety of high-fiber foods  Good examples are high-fiber cereals, beans, vegetables, and whole-grain breads  Walking is the best way to trigger your intestines to have a bowel movement  · Exercise regularly and maintain a healthy weight  Weight loss and exercise will decrease pressure on your bladder and help you control your leakage  · Use a catheter as directed  to help empty your bladder  A catheter is a tiny, plastic tube that is put into your bladder to drain your urine  · Go to behavior therapy as directed  Behavior therapy may be used to help you learn to control your urge to urinate      Weight Management   Why it is important to manage your weight:  Being overweight increases your risk of health conditions such as heart disease, high blood pressure, type 2 diabetes, and certain types of cancer  It can also increase your risk for osteoarthritis, sleep apnea, and other respiratory problems  Aim for a slow, steady weight loss  Even a small amount of weight loss can lower your risk of health problems  How to lose weight safely:  A safe and healthy way to lose weight is to eat fewer calories and get regular exercise  You can lose up about 1 pound a week by decreasing the number of calories you eat by 500 calories each day  Healthy meal plan for weight management:  A healthy meal plan includes a variety of foods, contains fewer calories, and helps you stay healthy  A healthy meal plan includes the following:  · Eat whole-grain foods more often  A healthy meal plan should contain fiber  Fiber is the part of grains, fruits, and vegetables that is not broken down by your body  Whole-grain foods are healthy and provide extra fiber in your diet  Some examples of whole-grain foods are whole-wheat breads and pastas, oatmeal, brown rice, and bulgur  · Eat a variety of vegetables every day  Include dark, leafy greens such as spinach, kale, flakito greens, and mustard greens  Eat yellow and orange vegetables such as carrots, sweet potatoes, and winter squash  · Eat a variety of fruits every day  Choose fresh or canned fruit (canned in its own juice or light syrup) instead of juice  Fruit juice has very little or no fiber  · Eat low-fat dairy foods  Drink fat-free (skim) milk or 1% milk  Eat fat-free yogurt and low-fat cottage cheese  Try low-fat cheeses such as mozzarella and other reduced-fat cheeses  · Choose meat and other protein foods that are low in fat  Choose beans or other legumes such as split peas or lentils  Choose fish, skinless poultry (chicken or turkey), or lean cuts of red meat (beef or pork)  Before you cook meat or poultry, cut off any visible fat  · Use less fat and oil  Try baking foods instead of frying them   Add less fat, such as margarine, sour cream, regular salad dressing and mayonnaise to foods  Eat fewer high-fat foods  Some examples of high-fat foods include french fries, doughnuts, ice cream, and cakes  · Eat fewer sweets  Limit foods and drinks that are high in sugar  This includes candy, cookies, regular soda, and sweetened drinks  Exercise:  Exercise at least 30 minutes per day on most days of the week  Some examples of exercise include walking, biking, dancing, and swimming  You can also fit in more physical activity by taking the stairs instead of the elevator or parking farther away from stores  Ask your healthcare provider about the best exercise plan for you  © Copyright [employer] 2018 Information is for End User's use only and may not be sold, redistributed or otherwise used for commercial purposes   All illustrations and images included in CareNotes® are the copyrighted property of [employer]  or [address] [place] [address]

## 2020-07-31 NOTE — PROGRESS NOTES
Assessment and Plan:     Problem List Items Addressed This Visit        Cardiovascular and Mediastinum    (HFpEF) heart failure with preserved ejection fraction (HCC)    Relevant Medications    bumetanide (BUMEX) 1 mg tablet       Other    Urinary incontinence    Relevant Orders    US bladder with post void residual    Ambulatory referral to Urogynecology    Class 1 obesity due to excess calories with serious comorbidity and body mass index (BMI) of 31 0 to 31 9 in adult    Medicare annual wellness visit, subsequent - Primary        BMI Counseling: There is no height or weight on file to calculate BMI  The BMI is above normal  Nutrition recommendations include decreasing portion sizes, encouraging healthy choices of fruits and vegetables, decreasing fast food intake, consuming healthier snacks and limiting drinks that contain sugar  Exercise recommendations include moderate physical activity 150 minutes/week, exercising 3-5 times per week, obtaining a gym membership and strength training exercises  No pharmacotherapy was ordered  Preventive health issues were discussed with patient, and age appropriate screening tests were ordered as noted in patient's After Visit Summary  Personalized health advice and appropriate referrals for health education or preventive services given if needed, as noted in patient's After Visit Summary  History of Present Illness:     Patient presents for Welcome to Medicare visit  Patient Care Team:  Kacy Santos MD as PCP - General     Review of Systems:     Review of Systems   All other systems reviewed and are negative       Problem List:     Patient Active Problem List   Diagnosis    (HFpEF) heart failure with preserved ejection fraction (HCC)    Abnormal chest xray    Acute blood loss anemia    Acute lower UTI    Acute respiratory failure (HCC)    Acute respiratory failure with hypoxia (HCC)    ANGEL (acute kidney injury) (HCC)    Anemia, pernicious    CAD (coronary artery disease)    Central sleep apnea    COPD (chronic obstructive pulmonary disease) (HCC)    Delirium    Mental disorder    Diarrhea    Fibromyalgia    Gastrointestinal hemorrhage associated with duodenal ulcer    Heart failure (HCC)    Hypernatremia    Hypertension    Osteoporosis    Pulmonary hypertension (HCC)    S/P CABG x 2    Sciatica of right side    Severe malnutrition (HCC)    Shock (HCC)    Shortness of breath    Skin rash    Urinary incontinence    Tendinitis of right rotator cuff    Painless hematuria    Other hyperlipidemia    Encounter for diabetic foot exam ([facility] [address] )    Class 1 obesity due to excess calories with serious comorbidity and body mass index (BMI) of 31 0 to 31 9 in adult    Medicare annual wellness visit, subsequent    Gastroenteritis and colitis, viral    PM[facility]      Past Medical and Surgical History:     Past Medical History:   Diagnosis Date    Delirium 8/19/2017     Past Surgical History:   Procedure Laterality Date    CARDIAC SURGERY        Family History:     Family History   Problem Relation Age of Onset    Diabetes Mother     Hypertension Mother     Stroke Father         CVA    Heart disease Brother         cardiac disorder    Diabetes Other     Hypertension Other     Osteoporosis Other     Lung disease Other     Cancer Other     Lung disease Other     Cancer Daughter     Thyroid disease Son       Social History:        Social History     Socioeconomic History    Marital status: /Civil Union     Spouse name: Not on file    Number of children: Not on file    Years of education: Not on file    Highest education level: Not on file   Occupational History    Not on file   Social Needs    Financial resource strain: Not on file    Food insecurity:     Worry: Not on file     Inability: Not on file    Transportation needs:     Medical: Not on file     Non-medical: Not on file   Tobacco Use    Smoking status: Former Smoker    Smokeless tobacco: Never Used   Substance and Sexual Activity    Alcohol use: No    Drug use: No    Sexual activity: Not on file   Lifestyle    Physical activity:     Days per week: Not on file     Minutes per session: Not on file    Stress: Not on file   Relationships    Social connections:     Talks on phone: Not on file     Gets together: Not on file     Attends Tenriism service: Not on file     Active member of club or organization: Not on file     Attends meetings of clubs or organizations: Not on file     Relationship status: Not on file    Intimate partner violence:     Fear of current or ex partner: Not on file     Emotionally abused: Not on file     Physically abused: Not on file     Forced sexual activity: Not on file   Other Topics Concern    Not on file   Social History Narrative    Not on file      Medications and Allergies:     Current Outpatient Medications   Medication Sig Dispense Refill    albuterol (PROVENTIL HFA) 90 mcg/act inhaler Inhale      alendronate (FOSAMAX) 70 mg tablet Take by mouth      aspirin 81 MG tablet Take by mouth      atorvastatin (LIPITOR) 20 mg tablet TAKE 2 TABLETS DAILY 180 tablet 3    B-D 3CC LUER-MARISABEL SYR 25GX5/8" 25G X 5/8" 3 ML MISC USE EVERY 30 DAYS AS DIRECTED 12 each 3    B-D 3CC LUER-MARISABEL SYR 26GX5/8" 26G X 5/8" 3 ML MISC Inject under the skin every 30 (thirty) days 4 each 0    bumetanide (BUMEX) 1 mg tablet Take 1 tablet (1 mg total) by mouth daily 90 tablet 4    Cholecalciferol (D3-1000) 25 MCG (1000 UT) tablet Take 2 tablets (2,000 Units total) by mouth daily 90 tablet 3    cyanocobalamin (VITAMIN B-12) 100 mcg tablet Take by mouth      Cyanocobalamin 1000 MCG/ML KIT Inject 1 mL (1,000 mcg total) as directed every 30 (thirty) days 10 mL 3    ferrous sulfate 325 (65 Fe) mg tablet Take 1 tablet by mouth daily with breakfast        folic acid (FOLVITE) 720 mcg tablet Take 1 tablet (400 mcg total) by mouth daily 90 tablet 3    gabapentin (NEURONTIN) 100 mg capsule TAKE 2 CAPSULES THREE TIMES A  capsule 7    Incontinence Supply Disposable (DEPEND EASY FIT UNDERGARMENTS) MISC by Does not apply route 3 (three) times a day 270 each 3    ipratropium-albuterol (COMBIVENT RESPIMAT) inhaler Inhale every 6 (six) hours      KLOR-CON M10 10 MEQ tablet TAKE 1 TABLET DAILY 90 tablet 4    lidocaine (LIDODERM) 5 % Place 1 patch on the skin daily 90 patch 3    LORazepam (ATIVAN) 1 mg tablet Take 1 tablet (1 mg total) by mouth every 8 (eight) hours as needed for anxiety 60 tablet 0    metoprolol tartrate (LOPRESSOR) 25 mg tablet TAKE 1 TABLET TWICE A  tablet 4    nitroglycerin (NITROSTAT) 0 4 mg SL tablet Place 1 tablet (0 4 mg total) under the tongue every 5 (five) minutes as needed for chest pain 100 tablet 0    nystatin (MYCOSTATIN) powder Apply topically 3 (three) times a day      omeprazole (PriLOSEC) 40 MG capsule TAKE 1 CAPSULE DAILY 90 capsule 4    ondansetron (ZOFRAN) 8 mg tablet Take 1 tablet (8 mg total) by mouth every 8 (eight) hours as needed for nausea or vomiting 30 tablet 0    oxybutynin (DITROPAN-XL) 10 MG 24 hr tablet TAKE 1 TABLET DAILY AT BEDTIME 90 tablet 3    oxybutynin (DITROPAN-XL) 5 mg 24 hr tablet TAKE 1 TABLET DAILY 90 tablet 3    potassium chloride (KLOR-CON 10) 10 mEq tablet Take 10 mEq by mouth      valsartan-hydrochlorothiazide (DIOVAN-HCT) 160-12 5 MG per tablet Take by mouth       No current facility-administered medications for this visit  No Known Allergies   Immunizations:     Immunization History   Administered Date(s) Administered    INFLUENZA 09/09/2016, 11/01/2017, 08/29/2018, 01/24/2020    Influenza Split High Dose Preservative Free IM 11/01/2017    Influenza TIV (IM) 09/09/2016    Pneumococcal Conjugate 13-Valent 12/01/2015    Pneumococcal Polysaccharide PPV23 10/13/2011    influenza, trivalent, adjuvanted 01/24/2020      Health Maintenance:      There are no preventive care reminders to display for this patient  Topic Date Due    DTaP,Tdap,and Td Vaccines (1 - Tdap) 09/14/1951    Influenza Vaccine  07/01/2020      Medicare Screening Tests and Risk Assessments:     Colin Goodman is here for her Subsequent Wellness visit  Health Risk Assessment:   Patient rates overall health as good  Patient feels that their physical health rating is slightly better  Eyesight was rated as much worse  Hearing was rated as slightly worse  Patient feels that their emotional and mental health rating is slightly worse  Pain experienced in the last 7 days has been some  Patient's pain rating has been 4/10  Patient states that she has experienced no weight loss or gain in last 6 months  Depression Screening:   PHQ-2 Score: 2      Fall Risk Screening: In the past year, patient has experienced: history of falling in past year    Number of falls: 2 or more  Injured during fall?: No    Feels unsteady when standing or walking?: Yes    Worried about falling?: Yes      Urinary Incontinence Screening:   Patient has leaked urine accidently in the last six months  Has tried kegal exercises and uses depends    Home Safety:  Patient has trouble with stairs inside or outside of their home  Patient has working smoke alarms and has working carbon monoxide detector  Has a stair lift  Had home care nurses come and evaluate the house and have made improvement    Nutrition:   Current diet is No Added Salt, Low Cholesterol and Frequent junk food  Medications:   Patient is currently taking over-the-counter supplements  OTC medications include: see medication list  Patient is able to manage medications  Activities of Daily Living (ADLs)/Instrumental Activities of Daily Living (IADLs):   Walk and transfer into and out of bed and chair?: Yes  Dress and groom yourself?: Yes    Bathe or shower yourself?: Yes    Feed yourself?  Yes  Do your laundry/housekeeping?: Yes  Manage your money, pay your bills and track your expenses?: Yes  Make your own meals?: Yes    Do your own shopping?: Yes    Previous Hospitalizations:   Any hospitalizations or ED visits within the last 12 months?: No      Advance Care Planning:   Living will: Yes    Durable POA for healthcare: Yes    Advanced directive: Yes    End of Life Decisions reviewed with patient: Yes    Provider agrees with end of life decisions: Yes      Comments:   DNR/DNI    Cognitive Screening:   Provider or family/friend/caregiver concerned regarding cognition?: No    PREVENTIVE SCREENINGS      Cardiovascular Screening:    General: Screening Not Indicated and History Lipid Disorder      Diabetes Screening:     General: Screening Not Indicated and History Diabetes      Colorectal Cancer Screening:     General: Screening Not Indicated      Breast Cancer Screening:     General: Screening Not Indicated      Cervical Cancer Screening:    General: Screening Not Indicated      Osteoporosis Screening:    General: Screening Not Indicated and History Osteoporosis      Abdominal Aortic Aneurysm (AAA) Screening:        General: Screening Not Indicated      Lung Cancer Screening:     General: Screening Not Indicated      Hepatitis C Screening:    General: Screening Not Indicated    Other Counseling Topics:   Car/seat belt/driving safety, sunscreen and calcium and vitamin D intake and regular weightbearing exercise  No exam data present     Physical Exam:     /86 (BP Location: Left arm, Patient Position: Sitting, Cuff Size: Large)   Pulse 57   Temp (!) 97 °F (36 1 °C) (Temporal)   Ht 5' 2" (1 575 m)   Wt 80 9 kg (178 lb 6 4 oz)   SpO2 94%   BMI 32 63 kg/m²     Physical Exam   Constitutional: She is oriented to person, place, and time  She appears well-developed and well-nourished  Cardiovascular: Normal rate, regular rhythm, normal heart sounds and intact distal pulses  Pulmonary/Chest: Effort normal and breath sounds normal    Abdominal: Soft   Bowel sounds are normal    Musculoskeletal: Normal range of motion  She exhibits edema ( mild bilateral pitting edema)  Neurological: She is alert and oriented to person, place, and time  Skin: Skin is warm and dry  Nursing note and vitals reviewed        Alvin Telles MD

## 2020-07-31 NOTE — PROGRESS NOTES
Assessment and Plan:     Problem List Items Addressed This Visit        Cardiovascular and Mediastinum    (HFpEF) heart failure with preserved ejection fraction (HCC)    Relevant Medications    bumetanide (BUMEX) 1 mg tablet       Other    Urinary incontinence     Ordered a residual bladder scan to rule out obstructive or neurogenic bladder ideology  Ordered a urogynecology referral          Relevant Orders    US bladder with post void residual    Ambulatory referral to Urogynecology    Class 1 obesity due to excess calories with serious comorbidity and body mass index (BMI) of 31 0 to 31 9 in adult    Medicare annual wellness visit, subsequent - Primary           Preventive health issues were discussed with patient, and age appropriate screening tests were ordered as noted in patient's After Visit Summary  Personalized health advice and appropriate referrals for health education or preventive services given if needed, as noted in patient's After Visit Summary       History of Present Illness:     Patient presents for Medicare Annual Wellness visit    Patient Care Team:  Latonay Torres MD as PCP - General     Problem List:     Patient Active Problem List   Diagnosis    (HFpEF) heart failure with preserved ejection fraction (HCC)    Abnormal chest xray    Acute blood loss anemia    Acute lower UTI    Acute respiratory failure (HCC)    Acute respiratory failure with hypoxia ([address] )    ANGEL (acute kidney injury) ([address] )    Anemia, pernicious    CAD (coronary artery disease)    Central sleep apnea    COPD (chronic obstructive pulmonary disease) ([address] )    Delirium    Mental disorder    Diarrhea    Fibromyalgia    Gastrointestinal hemorrhage associated with duodenal ulcer    Heart failure ([address] )    Hypernatremia    Hypertension    Osteoporosis    Pulmonary hypertension (HCC)    S/P CABG x 2    Sciatica of right side    Severe malnutrition (HCC)    Shock (HCC)    Shortness of breath    Skin rash    Urinary incontinence    Tendinitis of right rotator cuff    Painless hematuria    Other hyperlipidemia    Encounter for diabetic foot exam ([facility] [address] )    Class 1 obesity due to excess calories with serious comorbidity and body mass index (BMI) of 31 0 to 31 9 in adult    Medicare annual wellness visit, subsequent    Gastroenteritis and colitis, viral    PMR (polymyalgia rheumatica) (HCC)      Past Medical and Surgical History:     Past Medical History:   Diagnosis Date    Delirium 8/19/2017     Past Surgical History:   Procedure Laterality Date    CARDIAC SURGERY        Family History:     Family History   Problem Relation Age of Onset    Diabetes Mother     Hypertension Mother     Stroke Father         CVA    Heart disease Brother         cardiac disorder    Diabetes Other     Hypertension Other     Osteoporosis Other     Lung disease Other     Cancer Other     Lung disease Other     Cancer Daughter     Thyroid disease Son       Social History:        Social History     Socioeconomic History    Marital status: /Civil Union     Spouse name: Not on file    Number of children: Not on file    Years of education: Not on file    Highest education level: Not on file   Occupational History    Not on file   Social Needs    Financial resource strain: Not on file    Food insecurity:     Worry: Not on file     Inability: Not on file    Transportation needs:     Medical: Not on file     Non-medical: Not on file   Tobacco Use    Smoking status: Former Smoker    Smokeless tobacco: Never Used   Substance and Sexual Activity    Alcohol use: No    Drug use: No    Sexual activity: Not on file   Lifestyle    Physical activity:     Days per week: Not on file     Minutes per session: Not on file    Stress: Not on file   Relationships    Social connections:     Talks on phone: Not on file     Gets together: Not on file     Attends Episcopal service: Not on file     Active member of club or organization: Not on file     Attends meetings of clubs or organizations: Not on file     Relationship status: Not on file    Intimate partner violence:     Fear of current or ex partner: Not on file     Emotionally abused: Not on file     Physically abused: Not on file     Forced sexual activity: Not on file   Other Topics Concern    Not on file   Social History Narrative    Not on file      Medications and Allergies:     Current Outpatient Medications   Medication Sig Dispense Refill    albuterol (PROVENTIL HFA) 90 mcg/act inhaler Inhale      alendronate (FOSAMAX) 70 mg tablet Take by mouth      aspirin 81 MG tablet Take by mouth      atorvastatin (LIPITOR) 20 mg tablet TAKE 2 TABLETS DAILY 180 tablet 3    B-D 3CC LUER-MARISABEL SYR 25GX5/8" 25G X 5/8" 3 ML MISC USE EVERY 30 DAYS AS DIRECTED 12 each 3    B-D 3CC LUER-MARISABEL SYR 26GX5/8" 26G X 5/8" 3 ML MISC Inject under the skin every 30 (thirty) days 4 each 0    bumetanide (BUMEX) 1 mg tablet Take 1 tablet (1 mg total) by mouth daily 90 tablet 4    Cholecalciferol (D3-1000) 25 MCG (1000 UT) tablet Take 2 tablets (2,000 Units total) by mouth daily 90 tablet 3    cyanocobalamin (VITAMIN B-12) 100 mcg tablet Take by mouth      Cyanocobalamin 1000 MCG/ML KIT Inject 1 mL (1,000 mcg total) as directed every 30 (thirty) days 10 mL 3    ferrous sulfate 325 (65 Fe) mg tablet Take 1 tablet by mouth daily with breakfast        folic acid (FOLVITE) 733 mcg tablet Take 1 tablet (400 mcg total) by mouth daily 90 tablet 3    gabapentin (NEURONTIN) 100 mg capsule TAKE 2 CAPSULES THREE TIMES A  capsule 7    Incontinence Supply Disposable (DEPEND EASY FIT UNDERGARMENTS) MISC by Does not apply route 3 (three) times a day 270 each 3    ipratropium-albuterol (COMBIVENT RESPIMAT) inhaler Inhale every 6 (six) hours      KLOR-CON M10 10 MEQ tablet TAKE 1 TABLET DAILY 90 tablet 4    lidocaine (LIDODERM) 5 % Place 1 patch on the skin daily 90 patch 3    LORazepam (ATIVAN) 1 mg tablet Take 1 tablet (1 mg total) by mouth every 8 (eight) hours as needed for anxiety 60 tablet 0    metoprolol tartrate (LOPRESSOR) 25 mg tablet TAKE 1 TABLET TWICE A  tablet 4    nitroglycerin (NITROSTAT) 0 4 mg SL tablet Place 1 tablet (0 4 mg total) under the tongue every 5 (five) minutes as needed for chest pain 100 tablet 0    nystatin (MYCOSTATIN) powder Apply topically 3 (three) times a day      omeprazole (PriLOSEC) 40 MG capsule TAKE 1 CAPSULE DAILY 90 capsule 4    ondansetron (ZOFRAN) 8 mg tablet Take 1 tablet (8 mg total) by mouth every 8 (eight) hours as needed for nausea or vomiting 30 tablet 0    oxybutynin (DITROPAN-XL) 10 MG 24 hr tablet TAKE 1 TABLET DAILY AT BEDTIME 90 tablet 3    oxybutynin (DITROPAN-XL) 5 mg 24 hr tablet TAKE 1 TABLET DAILY 90 tablet 3    potassium chloride (KLOR-CON 10) 10 mEq tablet Take 10 mEq by mouth      valsartan-hydrochlorothiazide (DIOVAN-HCT) 160-12 5 MG per tablet Take by mouth       No current facility-administered medications for this visit  No Known Allergies   Immunizations:     Immunization History   Administered Date(s) Administered    INFLUENZA 09/09/2016, 11/01/2017, 08/29/2018, 01/24/2020    Influenza Split High Dose Preservative Free IM 11/01/2017    Influenza TIV (IM) 09/09/2016    Pneumococcal Conjugate 13-Valent 12/01/2015    Pneumococcal Polysaccharide PPV23 10/13/2011    influenza, trivalent, adjuvanted 01/24/2020      Health Maintenance: There are no preventive care reminders to display for this patient        Topic Date Due    DTaP,Tdap,and Td Vaccines (1 - Tdap) 09[birth year]14/1950    Influenza Vaccine  07/01/2020      Medicare Health Risk Assessment:     /86 (BP Location: Left arm, Patient Position: Sitting, Cuff Size: Large)   Pulse 57   Temp (!) 97 °F (36 1 °C) (Temporal)   Ht 5' 2" (1 575 m)   Wt 80 9 kg (178 lb 6 4 oz)   SpO2 94%   BMI 74 42 kg/m²      Colin Goodman is here for her Subsequent Wellness visit  Last Medicare Wellness visit information reviewed, patient interviewed, no change since last AWV  Health Risk Assessment:   Patient rates overall health as fair  Patient feels that their physical health rating is slightly worse  Eyesight was rated as slightly worse  Hearing was rated as slightly worse  Patient feels that their emotional and mental health rating is slightly worse  Pain experienced in the last 7 days has been some  Patient's pain rating has been 4/10  Patient states that she has experienced no weight loss or gain in last 6 months  Depression Screening:   PHQ-2 Score: 2      Fall Risk Screening: In the past year, patient has experienced: history of falling in past year      Urinary Incontinence Screening:   Patient has leaked urine accidently in the last six months  Home Safety:  Patient has trouble with stairs inside or outside of their home  Patient has working smoke alarms and has working carbon monoxide detector  Home safety hazards include: none  Nutrition:   Current diet is No Added Salt, Frequent junk food, Low Cholesterol and Limited junk food  Medications:   Patient is currently taking over-the-counter supplements  OTC medications include: see medication list  Patient is not able to manage medications  Activities of Daily Living (ADLs)/Instrumental Activities of Daily Living (IADLs):   Walk and transfer into and out of bed and chair?: Yes  Dress and groom yourself?: Yes    Bathe or shower yourself?: Yes    Feed yourself?  Yes  Do your laundry/housekeeping?: Yes  Manage your money, pay your bills and track your expenses?: Yes  Make your own meals?: Yes    Do your own shopping?: Yes    Previous Hospitalizations:   Any hospitalizations or ED visits within the last 12 months?: No      Advance Care Planning:   Living will: Yes    Advanced directive: Yes      PREVENTIVE SCREENINGS      Cardiovascular Screening:    General: Screening Not Indicated and History Lipid Disorder      Diabetes Screening:     General: Screening Not Indicated and History Diabetes      Cervical Cancer Screening:    General: Screening Not Indicated      Osteoporosis Screening:    General: Screening Not Indicated and History Osteoporosis      Lung Cancer Screening:     General: Screening Not Indicated      Pedro Lopez MD

## 2020-08-02 NOTE — PROGRESS NOTES
Patient is also complaining of significant stress incontinence may have some urge incontinence as well  States that she can not make it to the bathroom at all  States that she has to wear depends +of had and that usually weeks through where she then has to change  She gets the urge but also has the issue of incontinence with movement as well as with other increase in abdominal pressure  She currently takes oxybutynin q h s  but is unclear if she takes 10 mg, 5 mg or 15 mg  She finds no relief with this medication  She has tried physical therapy and Kegel exercises previously and declined wanting a PT referral at this time  She is to return to clinic in 3 months for follow-up  She understood, acknowledged and agreed to the plan above  All her questions and concerns were answered and addressed      Case discussed with Dr Noreen Perry

## 2020-08-02 NOTE — ASSESSMENT & PLAN NOTE
Ordered a residual bladder scan to rule out obstructive or neurogenic bladder ideology    Ordered a urogynecology referral

## 2020-10-17 DIAGNOSIS — K21.9 GASTROESOPHAGEAL REFLUX DISEASE: ICD-10-CM

## 2020-10-17 DIAGNOSIS — I50.30 (HFPEF) HEART FAILURE WITH PRESERVED EJECTION FRACTION (HCC): ICD-10-CM

## 2020-10-18 RX ORDER — POTASSIUM CHLORIDE 750 MG/1
TABLET, EXTENDED RELEASE ORAL
Qty: 90 TABLET | Refills: 3 | Status: SHIPPED | OUTPATIENT
Start: 2020-10-18 | End: 2021-02-24 | Stop reason: SDUPTHER

## 2020-10-18 RX ORDER — OMEPRAZOLE 40 MG/1
CAPSULE, DELAYED RELEASE ORAL
Qty: 90 CAPSULE | Refills: 3 | Status: SHIPPED | OUTPATIENT
Start: 2020-10-18 | End: 2021-04-27 | Stop reason: SDUPTHER

## 2020-11-17 DIAGNOSIS — E53.8 B12 DEFICIENCY: Primary | ICD-10-CM

## 2020-11-17 DIAGNOSIS — I10 ESSENTIAL HYPERTENSION: ICD-10-CM

## 2020-11-18 RX ORDER — CYANOCOBALAMIN 1000 UG/ML
INJECTION INTRAMUSCULAR; SUBCUTANEOUS
Qty: 10 ML | Refills: 3 | Status: SHIPPED | OUTPATIENT
Start: 2020-11-18 | End: 2021-12-20

## 2021-01-14 ENCOUNTER — TELEPHONE (OUTPATIENT)
Dept: FAMILY MEDICINE CLINIC | Facility: CLINIC | Age: 81
End: 2021-01-14

## 2021-01-14 NOTE — TELEPHONE ENCOUNTER
Nurse called stating that the patient is not taking her Bumex  She does not want to take the Oxy any more and is asking if she can take Tylenol 500mg  Nurse would like a call back to let her know if it is ok [phone]

## 2021-01-14 NOTE — TELEPHONE ENCOUNTER
Spoke with , stated her temp was 100 3 not 103 as nurse reported  Both are not feeling any better but requested to speak with you on phone  Scheduled for tomm to discuss

## 2021-01-20 ENCOUNTER — TELEPHONE (OUTPATIENT)
Dept: FAMILY MEDICINE CLINIC | Facility: CLINIC | Age: 81
End: 2021-01-20

## 2021-01-20 NOTE — TELEPHONE ENCOUNTER
viviane spoke to Oliver (daughter in law) regarding patient who stated she was confused, agitated, didn't want to do a video visit with the provider only a phone call (she was a TCM)  viviane spoke to maggie (homehealth nurse) who said mary was confused, very fink and sitting in  urine--she did call senior services  Viviane spoke to [facility] who advised she needed to go to the ER to be evaluated  Spoke to [place], patients son who is taking her to [place] [id number]

## 2021-01-21 ENCOUNTER — TELEPHONE (OUTPATIENT)
Dept: FAMILY MEDICINE CLINIC | Facility: CLINIC | Age: 81
End: 2021-01-21

## 2021-01-21 DIAGNOSIS — M75.81 TENDINITIS OF RIGHT ROTATOR CUFF: Primary | ICD-10-CM

## 2021-01-21 RX ORDER — ACETAMINOPHEN 500 MG
500 TABLET ORAL EVERY 6 HOURS PRN
Qty: 120 TABLET | Refills: 0 | Status: SHIPPED | OUTPATIENT
Start: 2021-01-21 | End: 2021-12-20

## 2021-01-26 DIAGNOSIS — I10 ESSENTIAL HYPERTENSION: Primary | ICD-10-CM

## 2021-02-02 ENCOUNTER — TELEPHONE (OUTPATIENT)
Dept: OTHER | Facility: OTHER | Age: 81
End: 2021-02-02

## 2021-02-02 NOTE — TELEPHONE ENCOUNTER
Caller wants the office to know, the patient is no longer showing signs of covid / she haven't had symptoms for 10 days or more and is requesting to have her isolate precaution lift

## 2021-02-11 ENCOUNTER — TELEPHONE (OUTPATIENT)
Dept: FAMILY MEDICINE CLINIC | Facility: CLINIC | Age: 81
End: 2021-02-11

## 2021-02-17 ENCOUNTER — TELEPHONE (OUTPATIENT)
Dept: FAMILY MEDICINE CLINIC | Facility: CLINIC | Age: 81
End: 2021-02-17

## 2021-02-17 NOTE — TELEPHONE ENCOUNTER
Nurse from [facility] called to inform that the patient asked to be discharged from OT after this week

## 2021-02-22 ENCOUNTER — TELEPHONE (OUTPATIENT)
Dept: FAMILY MEDICINE CLINIC | Facility: CLINIC | Age: 81
End: 2021-02-22

## 2021-02-23 ENCOUNTER — TELEPHONE (OUTPATIENT)
Dept: FAMILY MEDICINE CLINIC | Facility: CLINIC | Age: 81
End: 2021-02-23

## 2021-02-24 DIAGNOSIS — I50.30 (HFPEF) HEART FAILURE WITH PRESERVED EJECTION FRACTION (HCC): ICD-10-CM

## 2021-02-24 RX ORDER — BUMETANIDE 1 MG/1
1 TABLET ORAL DAILY
Qty: 90 TABLET | Refills: 4 | Status: SHIPPED | OUTPATIENT
Start: 2021-02-24 | End: 2021-02-25 | Stop reason: SDUPTHER

## 2021-02-24 RX ORDER — POTASSIUM CHLORIDE 750 MG/1
10 TABLET, EXTENDED RELEASE ORAL DAILY
Qty: 90 TABLET | Refills: 3 | Status: SHIPPED | OUTPATIENT
Start: 2021-02-24 | End: 2021-02-25 | Stop reason: SDUPTHER

## 2021-02-25 DIAGNOSIS — I50.30 (HFPEF) HEART FAILURE WITH PRESERVED EJECTION FRACTION (HCC): ICD-10-CM

## 2021-02-25 RX ORDER — POTASSIUM CHLORIDE 750 MG/1
10 TABLET, EXTENDED RELEASE ORAL DAILY
Qty: 90 TABLET | Refills: 3 | Status: SHIPPED | OUTPATIENT
Start: 2021-02-25 | End: 2021-12-20

## 2021-02-25 RX ORDER — BUMETANIDE 1 MG/1
1 TABLET ORAL DAILY
Qty: 90 TABLET | Refills: 4 | Status: SHIPPED | OUTPATIENT
Start: 2021-02-25 | End: 2022-04-21 | Stop reason: SDUPTHER

## 2021-04-27 ENCOUNTER — OFFICE VISIT (OUTPATIENT)
Dept: FAMILY MEDICINE CLINIC | Facility: CLINIC | Age: 81
End: 2021-04-27
Payer: MEDICARE

## 2021-04-27 VITALS
BODY MASS INDEX: 31.83 KG/M2 | WEIGHT: 173 LBS | HEART RATE: 68 BPM | DIASTOLIC BLOOD PRESSURE: 80 MMHG | HEIGHT: 62 IN | OXYGEN SATURATION: 95 % | SYSTOLIC BLOOD PRESSURE: 118 MMHG | TEMPERATURE: 97.9 F

## 2021-04-27 DIAGNOSIS — E43 SEVERE MALNUTRITION (HCC): ICD-10-CM

## 2021-04-27 DIAGNOSIS — S32.000D COMPRESSION FRACTURE OF LUMBAR VERTEBRA WITH ROUTINE HEALING, UNSPECIFIED LUMBAR VERTEBRAL LEVEL, SUBSEQUENT ENCOUNTER: ICD-10-CM

## 2021-04-27 DIAGNOSIS — E11.69 TYPE 2 DIABETES MELLITUS WITH OTHER SPECIFIED COMPLICATION, WITH LONG-TERM CURRENT USE OF INSULIN (HCC): ICD-10-CM

## 2021-04-27 DIAGNOSIS — I50.30 HEART FAILURE WITH PRESERVED EJECTION FRACTION, UNSPECIFIED HF CHRONICITY (HCC): ICD-10-CM

## 2021-04-27 DIAGNOSIS — I27.20 PULMONARY HYPERTENSION (HCC): ICD-10-CM

## 2021-04-27 DIAGNOSIS — M35.3 PMR (POLYMYALGIA RHEUMATICA) (HCC): ICD-10-CM

## 2021-04-27 DIAGNOSIS — K21.9 GASTROESOPHAGEAL REFLUX DISEASE: ICD-10-CM

## 2021-04-27 DIAGNOSIS — J44.9 CHRONIC OBSTRUCTIVE PULMONARY DISEASE, UNSPECIFIED COPD TYPE (HCC): Primary | ICD-10-CM

## 2021-04-27 DIAGNOSIS — Z79.4 TYPE 2 DIABETES MELLITUS WITH OTHER SPECIFIED COMPLICATION, WITH LONG-TERM CURRENT USE OF INSULIN (HCC): ICD-10-CM

## 2021-04-27 DIAGNOSIS — J96.01 ACUTE RESPIRATORY FAILURE WITH HYPOXIA (HCC): ICD-10-CM

## 2021-04-27 DIAGNOSIS — I50.9 HEART FAILURE, UNSPECIFIED HF CHRONICITY, UNSPECIFIED HEART FAILURE TYPE (HCC): ICD-10-CM

## 2021-04-27 PROBLEM — J96.00 ACUTE RESPIRATORY FAILURE (HCC): Status: RESOLVED | Noted: 2017-08-15 | Resolved: 2021-04-27

## 2021-04-27 PROBLEM — E11.9 DIABETES MELLITUS (HCC): Status: RESOLVED | Noted: 2017-06-20 | Resolved: 2021-04-27

## 2021-04-27 PROCEDURE — 99213 OFFICE O/P EST LOW 20 MIN: CPT | Performed by: FAMILY MEDICINE

## 2021-04-27 RX ORDER — CALCITONIN SALMON 200 [IU]/.09ML
1 SPRAY, METERED NASAL DAILY
Qty: 3.7 ML | Refills: 5 | Status: SHIPPED | OUTPATIENT
Start: 2021-04-27 | End: 2021-05-05 | Stop reason: SDUPTHER

## 2021-04-27 RX ORDER — OMEPRAZOLE 40 MG/1
40 CAPSULE, DELAYED RELEASE ORAL DAILY
Qty: 90 CAPSULE | Refills: 3 | Status: SHIPPED | OUTPATIENT
Start: 2021-04-27

## 2021-04-27 NOTE — PROGRESS NOTES
BMI Counseling: Body mass index is 31 64 kg/m²  The BMI is above normal  Nutrition recommendations include reducing portion sizes, decreasing overall calorie intake, 3-5 servings of fruits/vegetables daily, reducing fast food intake, consuming healthier snacks, decreasing soda and/or juice intake, moderation in carbohydrate intake, increasing intake of lean protein, reducing intake of saturated fat and trans fat and reducing intake of cholesterol  Exercise recommendations include moderate aerobic physical activity for 150 minutes/week, vigorous aerobic physical activity for 75 minutes/week, exercising 3-5 times per week, joining a gym and strength training exercises  Assessment/Plan:    No problem-specific Assessment & Plan notes found for this encounter  Diagnoses and all orders for this visit:    Chronic obstructive pulmonary disease, unspecified COPD type ([place] [address] )    Heart failure with preserved ejection fraction, unspecified HF chronicity ([place] [address] )    Heart failure, unspecified HF chronicity, unspecified heart failure type ([place] [address] )    Pulmonary hypertension ([place] [address] )    Severe malnutrition ([facility])    PMR (polymyalgia rheumatica) ([facility])    Type 2 diabetes mellitus with other specified complication, with long-term current use of insulin ([facility])    Acute respiratory failure with hypoxia ([facility])    Compression fracture of lumbar vertebra with routine healing, unspecified lumbar vertebral level, subsequent encounter  -     calcitonin, salmon, (MIACALCIN) 200 units/act nasal spray; 1 spray into each nostril daily    Gastroesophageal reflux disease  -     omeprazole (PriLOSEC) 40 MG capsule; Take 1 capsule (40 mg total) by mouth daily          Subjective:      Patient ID: Pau Witt is a 79 y o  female  She sustained a compression fracture in January of 2021  She still has mild to moderate pain          The following portions of the patient's history were reviewed and updated as appropriate:   She  has a past medical history of Acute respiratory failure with hypoxia ([facility] ) (7/23/2017) and Delirium (8/19/2017)  She   Patient Active Problem List    Diagnosis Date Noted    PMR (polymyalgia rheumatica) ([facility] ) 02/21/2020    Gastroenteritis and colitis, viral 10/03/2019    Encounter for diabetic foot exam ([facility] ) 06/10/2019    Class 1 obesity due to excess calories with serious comorbidity and body mass index (BMI) of 31 0 to 31 9 in adult 06/10/2019    Medicare annual wellness visit, subsequent 06/10/2019    Painless hematuria 06/19/2018    Other hyperlipidemia 06/19/2018    Tendinitis of right rotator cuff 02/02/2018    Anemia, pernicious 10/27/2017    Abnormal chest xray 10/03/2017    Diarrhea 10/03/2017    Skin rash 09/27/2017    Sciatica of right side 09/22/2017    Urinary incontinence 09/22/2017    Gastrointestinal hemorrhage associated with duodenal ulcer 08/24/2017    Delirium 08/19/2017    Hypernatremia 08/19/2017    Severe malnutrition ([facility] ) 08/16/2017    Acute blood loss anemia 08/15/2017    ANGEL (acute kidney injury) ([facility] ) 08/15/2017    Shock ([facility] ) 08/15/2017    (HFpEF) heart failure with preserved ejection fraction ([facility] ) 08/02/2017    Central sleep apnea 08/02/2017    COPD (chronic obstructive pulmonary disease) ([facility] ) 08/02/2017    Pulmonary hypertension ([facility] ) 08/02/2017    CAD (coronary artery disease) 07/23/2017    Heart failure ([facility] ) 07/23/2017    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    Acute lower UTI 07/21/2017    Mental disorder 06/20/2017    Fibromyalgia 06/20/2017    Hypertension 06/20/2017    Osteoporosis 06/20/2017    Shortness of breath 06/20/2017     She  has a past surgical history that includes Cardiac surgery  Her family history includes Cancer in her daughter and other; Diabetes in her mother and other; Heart disease in her brother; Hypertension in her mother and other; Lung disease in her other and other; Osteoporosis in her other; Stroke in her father; Thyroid disease in her son    She  reports that she has quit smoking  She has never used smokeless tobacco  She reports that she does not drink alcohol or use drugs    Current Outpatient Medications   Medication Sig Dispense Refill    acetaminophen (TYLENOL) 500 mg tablet Take 1 tablet (500 mg total) by mouth every 6 (six) hours as needed for mild pain or fever 120 tablet 0    alendronate (FOSAMAX) 70 mg tablet Take by mouth      aspirin 81 MG tablet Take by mouth      atorvastatin (LIPITOR) 20 mg tablet TAKE 2 TABLETS DAILY 180 tablet 3    B-D 3CC LUER-MARISABEL SYR 26GX5/8" 26G X 5/8" 3 ML MISC Inject under the skin every 30 (thirty) days 4 each 0    Cholecalciferol (D3-1000) 25 MCG (1000 UT) tablet Take 2 tablets (2,000 Units total) by mouth daily 90 tablet 3    cyanocobalamin 1,000 mcg/mL INJECT 1 ML (1,000 MCG TOTAL) AS DIRECTED EVERY 30 DAYS 10 mL 3    folic acid (FOLVITE) 507 mcg tablet Take 1 tablet (400 mcg total) by mouth daily 90 tablet 3    gabapentin (NEURONTIN) 100 mg capsule TAKE 2 CAPSULES THREE TIMES A  capsule 7    Incontinence Supply Disposable (DEPEND EASY FIT UNDERGARMENTS) MISC by Does not apply route 3 (three) times a day 270 each 3    metoprolol tartrate (LOPRESSOR) 25 mg tablet TAKE 1 TABLET TWICE A  tablet 3    nitroglycerin (NITROSTAT) 0 4 mg SL tablet Place 1 tablet (0 4 mg total) under the tongue every 5 (five) minutes as needed for chest pain 100 tablet 0    omeprazole (PriLOSEC) 40 MG capsule Take 1 capsule (40 mg total) by mouth daily 90 capsule 3    oxybutynin (DITROPAN-XL) 10 MG 24 hr tablet TAKE 1 TABLET DAILY AT BEDTIME 90 tablet 3    potassium chloride (K-DUR,KLOR-CON) 10 mEq tablet Take 1 tablet (10 mEq total) by mouth daily 90 tablet 3    solifenacin (VESICARE) 10 MG tablet Take 10 mg by mouth daily      albuterol (PROVENTIL HFA) 90 mcg/act inhaler Inhale      B Complex Vitamins (Vitamin B Complex 100)  mg      B-D 3CC LUER-MARISABEL SYR 25GX5/8" 25G X 5/8" 3 ML MISC USE EVERY 30 DAYS AS DIRECTED (Patient not taking: Reported on 4/27/2021) 12 each 3    bumetanide (BUMEX) 1 mg tablet Take 1 tablet (1 mg total) by mouth daily 90 tablet 4    calcitonin, salmon, (MIACALCIN) 200 units/act nasal spray 1 spray into each nostril daily 3 7 mL 5    ciprofloxacin (CIPRO) 250 mg tablet take 1 tablet by mouth twice a day for 1 day      cyanocobalamin (VITAMIN B-12) 100 mcg tablet Take by mouth      Cyanocobalamin 1000 MCG/ML KIT Inject 1 mL (1,000 mcg total) as directed every 30 (thirty) days (Patient not taking: Reported on 4/27/2021) 10 mL 3    dexamethasone (DECADRON) 6 mg tablet take 1 tablet by mouth daily for 4 days      ferrous sulfate 325 (65 Fe) mg tablet Take 1 tablet by mouth daily with breakfast        ipratropium-albuterol (COMBIVENT RESPIMAT) inhaler Inhale every 6 (six) hours      lidocaine (LIDODERM) 5 % Place 1 patch on the skin daily (Patient not taking: Reported on 4/27/2021) 90 patch 3    LORazepam (ATIVAN) 1 mg tablet Take 1 tablet (1 mg total) by mouth every 8 (eight) hours as needed for anxiety (Patient not taking: Reported on 4/27/2021) 60 tablet 0    nitrofurantoin (MACROBID) 100 mg capsule take 1 capsule by mouth twice a day for 5 days until finished      nystatin (MYCOSTATIN) powder Apply topically 3 (three) times a day      ondansetron (ZOFRAN) 8 mg tablet Take 1 tablet (8 mg total) by mouth every 8 (eight) hours as needed for nausea or vomiting (Patient not taking: Reported on 4/27/2021) 30 tablet 0    oxybutynin (DITROPAN-XL) 5 mg 24 hr tablet TAKE 1 TABLET DAILY (Patient not taking: Reported on 4/27/2021) 90 tablet 3    oxyCODONE (ROXICODONE) 5 mg immediate release tablet take 1 tablet by mouth every 4 hours if needed for pain or moderate pain      potassium chloride (KLOR-CON 10) 10 mEq tablet Take 10 mEq by mouth      solifenacin (VESICARE) 5 mg tablet Take 5 mg by mouth daily      valsartan-hydrochlorothiazide (DIOVAN-HCT) 160-12 5 MG per tablet Take by mouth No current facility-administered medications for this visit        Current Outpatient Medications on File Prior to Visit   Medication Sig    acetaminophen (TYLENOL) 500 mg tablet Take 1 tablet (500 mg total) by mouth every 6 (six) hours as needed for mild pain or fever    alendronate (FOSAMAX) 70 mg tablet Take by mouth    aspirin 81 MG tablet Take by mouth    atorvastatin (LIPITOR) 20 mg tablet TAKE 2 TABLETS DAILY    B-D 3CC LUER-MARISABEL SYR 26GX5/8" 26G X 5/8" 3 ML MISC Inject under the skin every 30 (thirty) days    Cholecalciferol (D3-1000) 25 MCG (1000 UT) tablet Take 2 tablets (2,000 Units total) by mouth daily    cyanocobalamin 1,000 mcg/mL INJECT 1 ML (1,000 MCG TOTAL) AS DIRECTED EVERY 30 DAYS    folic acid (FOLVITE) 375 mcg tablet Take 1 tablet (400 mcg total) by mouth daily    gabapentin (NEURONTIN) 100 mg capsule TAKE 2 CAPSULES THREE TIMES A DAY    Incontinence Supply Disposable (DEPEND EASY FIT UNDERGARMENTS) MISC by Does not apply route 3 (three) times a day    metoprolol tartrate (LOPRESSOR) 25 mg tablet TAKE 1 TABLET TWICE A DAY    nitroglycerin (NITROSTAT) 0 4 mg SL tablet Place 1 tablet (0 4 mg total) under the tongue every 5 (five) minutes as needed for chest pain    oxybutynin (DITROPAN-XL) 10 MG 24 hr tablet TAKE 1 TABLET DAILY AT BEDTIME    potassium chloride (K-DUR,KLOR-CON) 10 mEq tablet Take 1 tablet (10 mEq total) by mouth daily    solifenacin (VESICARE) 10 MG tablet Take 10 mg by mouth daily    [DISCONTINUED] omeprazole (PriLOSEC) 40 MG capsule TAKE 1 CAPSULE DAILY    albuterol (PROVENTIL HFA) 90 mcg/act inhaler Inhale    B Complex Vitamins (Vitamin B Complex 100)  mg    B-D 3CC LUER-MARISABEL SYR 25GX5/8" 25G X 5/8" 3 ML MISC USE EVERY 30 DAYS AS DIRECTED (Patient not taking: Reported on 4/27/2021)    bumetanide (BUMEX) 1 mg tablet Take 1 tablet (1 mg total) by mouth daily    ciprofloxacin (CIPRO) 250 mg tablet take 1 tablet by mouth twice a day for 1 day    cyanocobalamin (VITAMIN B-12) 100 mcg tablet Take by mouth    Cyanocobalamin 1000 MCG/ML KIT Inject 1 mL (1,000 mcg total) as directed every 30 (thirty) days (Patient not taking: Reported on 4/27/2021)    dexamethasone (DECADRON) 6 mg tablet take 1 tablet by mouth daily for 4 days    ferrous sulfate 325 (65 Fe) mg tablet Take 1 tablet by mouth daily with breakfast      ipratropium-albuterol (COMBIVENT RESPIMAT) inhaler Inhale every 6 (six) hours    lidocaine (LIDODERM) 5 % Place 1 patch on the skin daily (Patient not taking: Reported on 4/27/2021)    LORazepam (ATIVAN) 1 mg tablet Take 1 tablet (1 mg total) by mouth every 8 (eight) hours as needed for anxiety (Patient not taking: Reported on 4/27/2021)    nitrofurantoin (MACROBID) 100 mg capsule take 1 capsule by mouth twice a day for 5 days until finished    nystatin (MYCOSTATIN) powder Apply topically 3 (three) times a day    ondansetron (ZOFRAN) 8 mg tablet Take 1 tablet (8 mg total) by mouth every 8 (eight) hours as needed for nausea or vomiting (Patient not taking: Reported on 4/27/2021)    oxybutynin (DITROPAN-XL) 5 mg 24 hr tablet TAKE 1 TABLET DAILY (Patient not taking: Reported on 4/27/2021)    oxyCODONE (ROXICODONE) 5 mg immediate release tablet take 1 tablet by mouth every 4 hours if needed for pain or moderate pain    potassium chloride (KLOR-CON 10) 10 mEq tablet Take 10 mEq by mouth    solifenacin (VESICARE) 5 mg tablet Take 5 mg by mouth daily    valsartan-hydrochlorothiazide (DIOVAN-HCT) 160-12 5 MG per tablet Take by mouth     No current facility-administered medications on file prior to visit  She has No Known Allergies       Review of Systems   Musculoskeletal: Positive for back pain  All other systems reviewed and are negative          Objective:      /80   Pulse 68   Temp 97 9 °F (36 6 °C)   Ht 5' 2" (1 575 m)   Wt 78 5 kg (173 lb)   SpO2 95%   BMI 31 64 kg/m²          Physical Exam  Vitals signs and nursing note reviewed  Constitutional:       Appearance: Normal appearance  She is obese  Neck:      Musculoskeletal: Normal range of motion and neck supple  Cardiovascular:      Rate and Rhythm: Normal rate and regular rhythm  Pulses: Normal pulses  Heart sounds: Normal heart sounds  Pulmonary:      Effort: Pulmonary effort is normal       Breath sounds: Normal breath sounds  Abdominal:      General: Abdomen is flat  Bowel sounds are normal       Palpations: Abdomen is soft  Musculoskeletal: Normal range of motion  Skin:     General: Skin is warm and dry  Capillary Refill: Capillary refill takes less than 2 seconds  Neurological:      General: No focal deficit present  Mental Status: She is alert and oriented to person, place, and time  Mental status is at baseline  Psychiatric:         Mood and Affect: Mood normal          Behavior: Behavior normal          Thought Content:  Thought content normal          Judgment: Judgment normal

## 2021-05-02 DIAGNOSIS — I10 ESSENTIAL HYPERTENSION: ICD-10-CM

## 2021-05-03 RX ORDER — ATORVASTATIN CALCIUM 20 MG/1
TABLET, FILM COATED ORAL
Qty: 180 TABLET | Refills: 3 | Status: SHIPPED | OUTPATIENT
Start: 2021-05-03 | End: 2021-12-20 | Stop reason: SDUPTHER

## 2021-05-05 DIAGNOSIS — S32.000D COMPRESSION FRACTURE OF LUMBAR VERTEBRA WITH ROUTINE HEALING, UNSPECIFIED LUMBAR VERTEBRAL LEVEL, SUBSEQUENT ENCOUNTER: ICD-10-CM

## 2021-05-05 RX ORDER — CALCITONIN SALMON 200 [IU]/.09ML
1 SPRAY, METERED NASAL DAILY
Qty: 3.7 ML | Refills: 5 | Status: SHIPPED | OUTPATIENT
Start: 2021-05-05

## 2021-06-16 DIAGNOSIS — E53.8 B12 DEFICIENCY: ICD-10-CM

## 2021-08-04 DIAGNOSIS — E11.9 TYPE 2 DIABETES MELLITUS WITHOUT COMPLICATION, WITHOUT LONG-TERM CURRENT USE OF INSULIN (HCC): ICD-10-CM

## 2021-08-05 RX ORDER — GABAPENTIN 100 MG/1
CAPSULE ORAL
Qty: 270 CAPSULE | Refills: 7 | Status: SHIPPED | OUTPATIENT
Start: 2021-08-05 | End: 2021-12-20

## 2021-12-20 ENCOUNTER — OFFICE VISIT (OUTPATIENT)
Dept: FAMILY MEDICINE CLINIC | Facility: CLINIC | Age: 81
End: 2021-12-20
Payer: MEDICARE

## 2021-12-20 VITALS
HEART RATE: 96 BPM | BODY MASS INDEX: 30.36 KG/M2 | TEMPERATURE: 98 F | SYSTOLIC BLOOD PRESSURE: 120 MMHG | HEIGHT: 62 IN | WEIGHT: 165 LBS | DIASTOLIC BLOOD PRESSURE: 80 MMHG | OXYGEN SATURATION: 95 %

## 2021-12-20 DIAGNOSIS — I50.9 HEART FAILURE, UNSPECIFIED HF CHRONICITY, UNSPECIFIED HEART FAILURE TYPE (HCC): ICD-10-CM

## 2021-12-20 DIAGNOSIS — E78.49 OTHER HYPERLIPIDEMIA: ICD-10-CM

## 2021-12-20 DIAGNOSIS — E11.9 TYPE 2 DIABETES MELLITUS WITHOUT COMPLICATION, WITHOUT LONG-TERM CURRENT USE OF INSULIN (HCC): Primary | ICD-10-CM

## 2021-12-20 DIAGNOSIS — I27.9 CHRONIC PULMONARY HEART DISEASE (HCC): ICD-10-CM

## 2021-12-20 DIAGNOSIS — I27.9 CHRONIC PULMONARY HEART DISEASE (HCC): Primary | ICD-10-CM

## 2021-12-20 DIAGNOSIS — R26.9 GAIT DISTURBANCE: ICD-10-CM

## 2021-12-20 DIAGNOSIS — R41.3 MEMORY LOSS: ICD-10-CM

## 2021-12-20 DIAGNOSIS — I10 ESSENTIAL HYPERTENSION: ICD-10-CM

## 2021-12-20 DIAGNOSIS — R32 URINARY INCONTINENCE, UNSPECIFIED TYPE: ICD-10-CM

## 2021-12-20 DIAGNOSIS — E55.9 VITAMIN D DEFICIENCY: ICD-10-CM

## 2021-12-20 DIAGNOSIS — R29.6 RECURRENT FALLS: ICD-10-CM

## 2021-12-20 DIAGNOSIS — F11.20 CONTINUOUS OPIOID DEPENDENCE (HCC): ICD-10-CM

## 2021-12-20 DIAGNOSIS — R29.6 FREQUENT FALLS: ICD-10-CM

## 2021-12-20 PROBLEM — R57.9 SHOCK (HCC): Status: RESOLVED | Noted: 2017-08-15 | Resolved: 2021-12-20

## 2021-12-20 PROBLEM — M35.3 PMR (POLYMYALGIA RHEUMATICA) (HCC): Status: RESOLVED | Noted: 2020-02-21 | Resolved: 2021-12-20

## 2021-12-20 PROCEDURE — 1123F ACP DISCUSS/DSCN MKR DOCD: CPT | Performed by: FAMILY MEDICINE

## 2021-12-20 PROCEDURE — 99214 OFFICE O/P EST MOD 30 MIN: CPT | Performed by: FAMILY MEDICINE

## 2021-12-20 PROCEDURE — G0439 PPPS, SUBSEQ VISIT: HCPCS | Performed by: FAMILY MEDICINE

## 2021-12-20 RX ORDER — ATORVASTATIN CALCIUM 20 MG/1
40 TABLET, FILM COATED ORAL DAILY
Qty: 180 TABLET | Refills: 3 | Status: SHIPPED | OUTPATIENT
Start: 2021-12-20 | End: 2022-04-14 | Stop reason: SDUPTHER

## 2021-12-20 RX ORDER — DONEPEZIL HYDROCHLORIDE 5 MG/1
5 TABLET, FILM COATED ORAL
Qty: 90 TABLET | Refills: 3 | Status: SHIPPED | OUTPATIENT
Start: 2021-12-20

## 2021-12-29 ENCOUNTER — NURSE TRIAGE (OUTPATIENT)
Dept: OTHER | Facility: OTHER | Age: 81
End: 2021-12-29

## 2022-01-13 DIAGNOSIS — E53.8 B12 DEFICIENCY: ICD-10-CM

## 2022-01-13 RX ORDER — CYANOCOBALAMIN 1000 UG/ML
INJECTION INTRAMUSCULAR; SUBCUTANEOUS
Qty: 10 ML | Refills: 3 | Status: SHIPPED | OUTPATIENT
Start: 2022-01-13 | End: 2022-07-28 | Stop reason: ALTCHOICE

## 2022-01-31 DIAGNOSIS — I10 ESSENTIAL HYPERTENSION: ICD-10-CM

## 2022-02-24 DIAGNOSIS — I50.30 (HFPEF) HEART FAILURE WITH PRESERVED EJECTION FRACTION (HCC): ICD-10-CM

## 2022-02-24 RX ORDER — POTASSIUM CHLORIDE 750 MG/1
TABLET, EXTENDED RELEASE ORAL
Qty: 90 TABLET | Refills: 3 | Status: SHIPPED | OUTPATIENT
Start: 2022-02-24 | End: 2022-07-12 | Stop reason: SDUPTHER

## 2022-03-08 ENCOUNTER — TELEPHONE (OUTPATIENT)
Dept: FAMILY MEDICINE CLINIC | Facility: CLINIC | Age: 82
End: 2022-03-08

## 2022-03-09 DIAGNOSIS — R39.9 UTI SYMPTOMS: Primary | ICD-10-CM

## 2022-03-09 RX ORDER — CIPROFLOXACIN 250 MG/1
250 TABLET, FILM COATED ORAL EVERY 12 HOURS SCHEDULED
Qty: 6 TABLET | Refills: 0 | Status: SHIPPED | OUTPATIENT
Start: 2022-03-09 | End: 2022-03-12

## 2022-03-16 ENCOUNTER — APPOINTMENT (OUTPATIENT)
Dept: LAB | Facility: CLINIC | Age: 82
End: 2022-03-16
Payer: MEDICARE

## 2022-03-16 DIAGNOSIS — R41.3 MEMORY LOSS: ICD-10-CM

## 2022-03-16 DIAGNOSIS — I50.9 HEART FAILURE, UNSPECIFIED HF CHRONICITY, UNSPECIFIED HEART FAILURE TYPE (HCC): ICD-10-CM

## 2022-03-16 DIAGNOSIS — E11.9 TYPE 2 DIABETES MELLITUS WITHOUT COMPLICATION, WITHOUT LONG-TERM CURRENT USE OF INSULIN (HCC): ICD-10-CM

## 2022-03-16 DIAGNOSIS — I27.9 CHRONIC PULMONARY HEART DISEASE (HCC): ICD-10-CM

## 2022-03-16 DIAGNOSIS — I10 ESSENTIAL HYPERTENSION: ICD-10-CM

## 2022-03-16 DIAGNOSIS — E55.9 VITAMIN D DEFICIENCY: ICD-10-CM

## 2022-03-16 LAB
ALBUMIN SERPL BCP-MCNC: 3.6 G/DL (ref 3.5–5)
ALP SERPL-CCNC: 68 U/L (ref 46–116)
ALT SERPL W P-5'-P-CCNC: 18 U/L (ref 12–78)
ANION GAP SERPL CALCULATED.3IONS-SCNC: 5 MMOL/L (ref 4–13)
AST SERPL W P-5'-P-CCNC: 19 U/L (ref 5–45)
BILIRUB SERPL-MCNC: 0.89 MG/DL (ref 0.2–1)
BUN SERPL-MCNC: 13 MG/DL (ref 5–25)
CALCIUM SERPL-MCNC: 9.6 MG/DL (ref 8.3–10.1)
CHLORIDE SERPL-SCNC: 105 MMOL/L (ref 100–108)
CO2 SERPL-SCNC: 28 MMOL/L (ref 21–32)
CREAT SERPL-MCNC: 0.9 MG/DL (ref 0.6–1.3)
GFR SERPL CREATININE-BSD FRML MDRD: 59 ML/MIN/1.73SQ M
GLUCOSE P FAST SERPL-MCNC: 85 MG/DL (ref 65–99)
POTASSIUM SERPL-SCNC: 4.5 MMOL/L (ref 3.5–5.3)
PROT SERPL-MCNC: 7.8 G/DL (ref 6.4–8.2)
SODIUM SERPL-SCNC: 138 MMOL/L (ref 136–145)

## 2022-03-16 PROCEDURE — 81001 URINALYSIS AUTO W/SCOPE: CPT | Performed by: FAMILY MEDICINE

## 2022-03-16 PROCEDURE — 83880 ASSAY OF NATRIURETIC PEPTIDE: CPT

## 2022-03-16 PROCEDURE — 82570 ASSAY OF URINE CREATININE: CPT | Performed by: FAMILY MEDICINE

## 2022-03-16 PROCEDURE — 82043 UR ALBUMIN QUANTITATIVE: CPT | Performed by: FAMILY MEDICINE

## 2022-03-16 PROCEDURE — 82607 VITAMIN B-12: CPT

## 2022-03-16 PROCEDURE — 83036 HEMOGLOBIN GLYCOSYLATED A1C: CPT

## 2022-03-16 PROCEDURE — 84443 ASSAY THYROID STIM HORMONE: CPT

## 2022-03-16 PROCEDURE — 80053 COMPREHEN METABOLIC PANEL: CPT

## 2022-03-16 PROCEDURE — 36415 COLL VENOUS BLD VENIPUNCTURE: CPT

## 2022-03-16 PROCEDURE — 80061 LIPID PANEL: CPT

## 2022-03-16 PROCEDURE — 82306 VITAMIN D 25 HYDROXY: CPT

## 2022-03-17 LAB
25(OH)D3 SERPL-MCNC: 58.5 NG/ML (ref 30–100)
BACTERIA UR QL AUTO: ABNORMAL /HPF
BILIRUB UR QL STRIP: NEGATIVE
CHOLEST SERPL-MCNC: 146 MG/DL
CLARITY UR: CLEAR
COLOR UR: ABNORMAL
CREAT UR-MCNC: 58.7 MG/DL
EST. AVERAGE GLUCOSE BLD GHB EST-MCNC: 114 MG/DL
GLUCOSE UR STRIP-MCNC: NEGATIVE MG/DL
HBA1C MFR BLD: 5.6 %
HDLC SERPL-MCNC: 86 MG/DL
HGB UR QL STRIP.AUTO: NEGATIVE
KETONES UR STRIP-MCNC: NEGATIVE MG/DL
LDLC SERPL CALC-MCNC: 48 MG/DL (ref 0–100)
LEUKOCYTE ESTERASE UR QL STRIP: ABNORMAL
MICROALBUMIN UR-MCNC: 71.2 MG/L (ref 0–20)
MICROALBUMIN/CREAT 24H UR: 121 MG/G CREATININE (ref 0–30)
NITRITE UR QL STRIP: POSITIVE
NON-SQ EPI CELLS URNS QL MICRO: ABNORMAL /HPF
NONHDLC SERPL-MCNC: 60 MG/DL
NT-PROBNP SERPL-MCNC: 876 PG/ML
PH UR STRIP.AUTO: 6.5 [PH]
PROT UR STRIP-MCNC: ABNORMAL MG/DL
RBC #/AREA URNS AUTO: ABNORMAL /HPF
SP GR UR STRIP.AUTO: 1.01 (ref 1–1.03)
TRIGL SERPL-MCNC: 61 MG/DL
TSH SERPL DL<=0.05 MIU/L-ACNC: 1.25 UIU/ML (ref 0.36–3.74)
UROBILINOGEN UR STRIP-ACNC: <2 MG/DL
VIT B12 SERPL-MCNC: 510 PG/ML (ref 100–900)
WBC #/AREA URNS AUTO: ABNORMAL /HPF

## 2022-03-25 ENCOUNTER — TELEPHONE (OUTPATIENT)
Dept: FAMILY MEDICINE CLINIC | Facility: CLINIC | Age: 82
End: 2022-03-25

## 2022-03-25 NOTE — TELEPHONE ENCOUNTER
Pt said shes having a lot of pain with her feet, wanted to know if she could go back on the gabapentin, she believes someone dc it on her     If so, uses [facility]

## 2022-03-28 DIAGNOSIS — E11.9 TYPE 2 DIABETES MELLITUS WITHOUT COMPLICATION, WITHOUT LONG-TERM CURRENT USE OF INSULIN (HCC): Primary | ICD-10-CM

## 2022-03-28 RX ORDER — GABAPENTIN 300 MG/1
CAPSULE ORAL
Qty: 90 CAPSULE | Refills: 3 | Status: SHIPPED | OUTPATIENT
Start: 2022-03-28 | End: 2022-06-27

## 2022-04-14 DIAGNOSIS — I10 ESSENTIAL HYPERTENSION: ICD-10-CM

## 2022-04-14 RX ORDER — ATORVASTATIN CALCIUM 20 MG/1
40 TABLET, FILM COATED ORAL DAILY
Qty: 180 TABLET | Refills: 3 | Status: SHIPPED | OUTPATIENT
Start: 2022-04-14

## 2022-04-21 DIAGNOSIS — I50.30 (HFPEF) HEART FAILURE WITH PRESERVED EJECTION FRACTION (HCC): ICD-10-CM

## 2022-04-21 RX ORDER — BUMETANIDE 1 MG/1
1 TABLET ORAL DAILY
Qty: 90 TABLET | Refills: 0 | Status: SHIPPED | OUTPATIENT
Start: 2022-04-21 | End: 2022-06-27

## 2022-06-03 NOTE — TELEPHONE ENCOUNTER
Writer called patient in regards to message below, patient states he is heading to [place] today and has not been contacted about the Holter monitor. Advised patient order was placed 06/01/22 someone should be contacting patient if not number for Latoya Abdi was sent to patient's portal. Patient has no furhter questions at this time. pts daughter called, states pt is c/o increased urinary issues and loss of urine  Asking if pt should have labs done  Daughter was offered appt for pt but prefers message sent to see what you would recommend first, believes pt may need labs done prior to a appt    ty

## 2022-06-21 ENCOUNTER — RA CDI HCC (OUTPATIENT)
Dept: OTHER | Facility: HOSPITAL | Age: 82
End: 2022-06-21

## 2022-06-21 NOTE — PROGRESS NOTES
[facility]  coding opportunities     E11 22 and I13 0     Chart Reviewed number of suggestions sent to Provider: 2     Patients Insurance     Medicare Insurance: [employer]

## 2022-06-27 ENCOUNTER — OFFICE VISIT (OUTPATIENT)
Dept: FAMILY MEDICINE CLINIC | Facility: CLINIC | Age: 82
End: 2022-06-27
Payer: MEDICARE

## 2022-06-27 VITALS
HEIGHT: 62 IN | HEART RATE: 68 BPM | SYSTOLIC BLOOD PRESSURE: 110 MMHG | WEIGHT: 167 LBS | OXYGEN SATURATION: 94 % | RESPIRATION RATE: 18 BRPM | BODY MASS INDEX: 30.73 KG/M2 | TEMPERATURE: 98.4 F | DIASTOLIC BLOOD PRESSURE: 60 MMHG

## 2022-06-27 DIAGNOSIS — F11.20 CONTINUOUS OPIOID DEPENDENCE (HCC): ICD-10-CM

## 2022-06-27 DIAGNOSIS — I27.20 PULMONARY HYPERTENSION (HCC): ICD-10-CM

## 2022-06-27 DIAGNOSIS — G89.29 CHRONIC BILATERAL LOW BACK PAIN WITHOUT SCIATICA: ICD-10-CM

## 2022-06-27 DIAGNOSIS — M54.50 CHRONIC BILATERAL LOW BACK PAIN WITHOUT SCIATICA: ICD-10-CM

## 2022-06-27 DIAGNOSIS — Z23 ENCOUNTER FOR IMMUNIZATION: Primary | ICD-10-CM

## 2022-06-27 DIAGNOSIS — I25.118 CORONARY ARTERY DISEASE OF NATIVE HEART WITH STABLE ANGINA PECTORIS, UNSPECIFIED VESSEL OR LESION TYPE (HCC): ICD-10-CM

## 2022-06-27 DIAGNOSIS — E43 SEVERE MALNUTRITION (HCC): ICD-10-CM

## 2022-06-27 DIAGNOSIS — N18.30 STAGE 3 CHRONIC KIDNEY DISEASE, UNSPECIFIED WHETHER STAGE 3A OR 3B CKD (HCC): ICD-10-CM

## 2022-06-27 DIAGNOSIS — I27.9 CHRONIC PULMONARY HEART DISEASE (HCC): ICD-10-CM

## 2022-06-27 DIAGNOSIS — I50.30 HEART FAILURE WITH PRESERVED EJECTION FRACTION, UNSPECIFIED HF CHRONICITY (HCC): Primary | ICD-10-CM

## 2022-06-27 DIAGNOSIS — I50.30 HEART FAILURE WITH PRESERVED EJECTION FRACTION, UNSPECIFIED HF CHRONICITY (HCC): ICD-10-CM

## 2022-06-27 DIAGNOSIS — E11.69 TYPE 2 DIABETES MELLITUS WITH OTHER SPECIFIED COMPLICATION, WITH LONG-TERM CURRENT USE OF INSULIN (HCC): ICD-10-CM

## 2022-06-27 DIAGNOSIS — Z79.4 TYPE 2 DIABETES MELLITUS WITH OTHER SPECIFIED COMPLICATION, WITH LONG-TERM CURRENT USE OF INSULIN (HCC): ICD-10-CM

## 2022-06-27 DIAGNOSIS — I10 PRIMARY HYPERTENSION: ICD-10-CM

## 2022-06-27 DIAGNOSIS — N17.9 AKI (ACUTE KIDNEY INJURY) (HCC): ICD-10-CM

## 2022-06-27 DIAGNOSIS — I50.9 HEART FAILURE, UNSPECIFIED HF CHRONICITY, UNSPECIFIED HEART FAILURE TYPE (HCC): ICD-10-CM

## 2022-06-27 DIAGNOSIS — J44.9 CHRONIC OBSTRUCTIVE PULMONARY DISEASE, UNSPECIFIED COPD TYPE (HCC): ICD-10-CM

## 2022-06-27 PROCEDURE — 99215 OFFICE O/P EST HI 40 MIN: CPT | Performed by: FAMILY MEDICINE

## 2022-06-27 RX ORDER — LIDOCAINE 50 MG/G
1 PATCH TOPICAL DAILY
Qty: 30 PATCH | Refills: 5 | Status: SHIPPED | OUTPATIENT
Start: 2022-06-27

## 2022-06-27 NOTE — PROGRESS NOTES
Assessment/Plan:    No problem-specific Assessment & Plan notes found for this encounter  Diagnoses and all orders for this visit:    Encounter for immunization  -     Lipid panel; Future  -     Comprehensive metabolic panel; Future  -     TSH, 3rd generation; Future  -     NT-BNP PRO; Future    Chronic obstructive pulmonary disease, unspecified COPD type ([place] [address] )  -     Lipid panel; Future  -     Comprehensive metabolic panel; Future  -     TSH, 3rd generation; Future  -     NT-BNP PRO; Future    Heart failure with preserved ejection fraction, unspecified HF chronicity ([place] [address] )  -     Lipid panel; Future  -     Comprehensive metabolic panel; Future  -     TSH, 3rd generation; Future  -     NT-BNP PRO; Future    Coronary artery 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, unspecified vessel or lesion type ([place] [address] )  -     Lipid panel; Future  -     Comprehensive metabolic panel; Future  -     TSH, 3rd generation; Future  -     NT-BNP PRO; Future    Heart failure, unspecified HF chronicity, unspecified heart failure type ([place] [address] )  -     Lipid panel; Future  -     Comprehensive metabolic panel; Future  -     TSH, 3rd generation; Future  -     NT-BNP PRO; Future    Primary hypertension  -     Lipid panel; Future  -     Comprehensive metabolic panel; Future  -     TSH, 3rd generation; Future  -     NT-BNP PRO; Future    Pulmonary hypertension (HCC)  -     Lipid panel; Future  -     Comprehensive metabolic panel; Future  -     TSH, 3rd generation; Future  -     NT-BNP PRO; Future    Chronic pulmonary heart disease (HCC)  -     Lipid panel; Future  -     Comprehensive metabolic panel; Future  -     TSH, 3rd generation; Future  -     NT-BNP PRO; Future    Severe malnutrition ([place] [address] )  -     Lipid panel; Future  -     Comprehensive metabolic panel; Future  -     TSH, 3rd generation; Future  -     NT-BNP PRO; Future    ANGEL (acute kidney injury) ([place] [address] )  -     Lipid panel; Future  -     Comprehensive metabolic panel;  Future  - TSH, 3rd generation; Future  -     NT-BNP PRO; Future    Chronic bilateral low back pain without sciatica  -     lidocaine (Lidoderm) 5 %; Apply 1 patch topically daily Remove & Discard patch within 12 hours or as directed by MD    Stage 3 chronic kidney disease, unspecified whether stage 3a or 3b CKD ([place] [address] )    Continuous opioid dependence ([place] [address] )    Type 2 diabetes mellitus with other specified complication, with long-term current use of insulin ([place] [address] )    Other orders  -     B-D 3CC LUER-MARISABEL SYR 25GX5/8" 25G X 5/8" 3 ML MISC; USE EVERY 30 DAYS AS DIRECTED          Subjective:      Patient ID: Pankaj Cardozo is a 79 y o  female  She does not met the criteria for T2DM  Her BP is at goal on her current regimen  She has no CP or SOB  She has no HA or vision changes  Her lipids are at goal on her current regimen  She has normal liver function testing  She has no myalgia or muscle weakness  She has CAD  She denies CP or STEVENS  The following portions of the patient's history were reviewed and updated as appropriate:   She  has a past medical history of Acute respiratory failure with hypoxia ([place] [address] ) (7/23/2017) and Delirium (8/19/2017)    She   Patient Active Problem List    Diagnosis Date Noted    Stage 3 chronic kidney disease, unspecified whether stage 3a or 3b CKD ([place] [address] ) 06/27/2022    Continuous opioid dependence ([place] [address] ) 12/20/2021    Frequent falls 12/20/2021    Gastroenteritis and colitis, viral 10/03/2019    Encounter for diabetic foot exam ([place] [address] ) 06/10/2019    Class 1 obesity due to excess calories with serious comorbidity and body mass index (BMI) of 31 0 to 31 9 in adult 06/10/2019    Medicare annual wellness visit, subsequent 06/10/2019    Painless hematuria 06/19/2018    Other hyperlipidemia 06/19/2018    Tendinitis of right rotator cuff 02/02/2018    Anemia, pernicious 10/27/2017    Abnormal chest xray 10/03/2017    Diarrhea 10/03/2017    Skin rash 09/27/2017    Sciatica of right side 09/22/2017    Urinary incontinence 09/22/2017    Gastrointestinal hemorrhage associated with duodenal ulcer 08/24/2017    Delirium 08/19/2017    Hypernatremia 08/19/2017    Severe malnutrition ([place] [address] ) 08/16/2017    Acute blood loss anemia 08/15/2017    ANGEL (acute kidney injury) ([place] [address] ) 08/15/2017    (HFpEF) heart failure with preserved ejection fraction ([place] [address] ) 08/02/2017    Central sleep apnea 08/02/2017    COPD (chronic obstructive pulmonary disease) ([place] [address] ) 08/02/2017    Pulmonary hypertension ([place] [address] ) 08/02/2017    CAD (coronary artery disease) 07/23/2017    Heart failure ([place] [address] ) 07/23/2017    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    Acute lower UTI 07/21/2017    Mental disorder 06/20/2017    Fibromyalgia 06/20/2017    Hypertension 06/20/2017    Osteoporosis 06/20/2017    Shortness of breath 06/20/2017    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 06/30/2015    Chronic pulmonary heart disease ([place] [address] ) 06/30/2015     She  has a past surgical history that includes Cardiac surgery  Her family history includes Cancer in her daughter and other; Diabetes in her mother and other; Heart disease in her brother; Hypertension in her mother and other; Lung disease in her other and other; Osteoporosis in her other; Stroke in her father; Thyroid disease in her son  She  reports that she has quit smoking  Her smoking use included cigarettes  She has never used smokeless tobacco  She reports that she does not drink alcohol and does not use drugs    Current Outpatient Medications   Medication Sig Dispense Refill    alendronate (FOSAMAX) 70 mg tablet Take 70 mg by mouth every 7 days      aspirin 81 MG tablet Take 81 mg by mouth daily      atorvastatin (LIPITOR) 20 mg tablet Take 2 tablets (40 mg total) by mouth daily 180 tablet 3    B-D 3CC LUER-MARISABEL SYR 25GX5/8" 25G X 5/8" 3 ML MISC USE EVERY 30 DAYS AS DIRECTED      calcitonin, salmon, (MIACALCIN) 200 units/act nasal spray 1 spray into each nostril daily 3 7 mL 5  Cholecalciferol (D3-1000) 25 MCG (1000 UT) tablet Take 2 tablets (2,000 Units total) by mouth daily 90 tablet 3    cyanocobalamin 1,000 mcg/mL INJECT 1 ML (1,000 MCG TOTAL) AS DIRECTED EVERY 30 DAYS 10 mL 3    donepezil (ARICEPT) 5 mg tablet Take 1 tablet (5 mg total) by mouth daily at bedtime 90 tablet 3    folic acid (FOLVITE) 538 mcg tablet Take 1 tablet (400 mcg total) by mouth daily 90 tablet 3    Incontinence Supply Disposable (DEPEND EASY FIT UNDERGARMENTS) MISC by Does not apply route 3 (three) times a day 270 each 3    lidocaine (Lidoderm) 5 % Apply 1 patch topically daily Remove & Discard patch within 12 hours or as directed by MD 30 patch 5    metoprolol tartrate (LOPRESSOR) 25 mg tablet TAKE 1 TABLET TWICE A  tablet 3    nitroglycerin (NITROSTAT) 0 4 mg SL tablet Place 1 tablet (0 4 mg total) under the tongue every 5 (five) minutes as needed for chest pain 100 tablet 0    omeprazole (PriLOSEC) 40 MG capsule Take 1 capsule (40 mg total) by mouth daily 90 capsule 3    potassium chloride (K-DUR,KLOR-CON) 10 mEq tablet TAKE 1 TABLET DAILY 90 tablet 3    solifenacin (VESICARE) 10 MG tablet Take 10 mg by mouth daily       No current facility-administered medications for this visit       Current Outpatient Medications on File Prior to Visit   Medication Sig    alendronate (FOSAMAX) 70 mg tablet Take 70 mg by mouth every 7 days    aspirin 81 MG tablet Take 81 mg by mouth daily    atorvastatin (LIPITOR) 20 mg tablet Take 2 tablets (40 mg total) by mouth daily    B-D 3CC LUER-MARISABEL SYR 25GX5/8" 25G X 5/8" 3 ML MISC USE EVERY 30 DAYS AS DIRECTED    calcitonin, salmon, (MIACALCIN) 200 units/act nasal spray 1 spray into each nostril daily    Cholecalciferol (D3-1000) 25 MCG (1000 UT) tablet Take 2 tablets (2,000 Units total) by mouth daily    cyanocobalamin 1,000 mcg/mL INJECT 1 ML (1,000 MCG TOTAL) AS DIRECTED EVERY 30 DAYS    donepezil (ARICEPT) 5 mg tablet Take 1 tablet (5 mg total) by mouth daily at bedtime    folic acid (FOLVITE) 441 mcg tablet Take 1 tablet (400 mcg total) by mouth daily    Incontinence Supply Disposable (DEPEND EASY FIT UNDERGARMENTS) MISC by Does not apply route 3 (three) times a day    metoprolol tartrate (LOPRESSOR) 25 mg tablet TAKE 1 TABLET TWICE A DAY    nitroglycerin (NITROSTAT) 0 4 mg SL tablet Place 1 tablet (0 4 mg total) under the tongue every 5 (five) minutes as needed for chest pain    omeprazole (PriLOSEC) 40 MG capsule Take 1 capsule (40 mg total) by mouth daily    potassium chloride (K-DUR,KLOR-CON) 10 mEq tablet TAKE 1 TABLET DAILY    solifenacin (VESICARE) 10 MG tablet Take 10 mg by mouth daily    [DISCONTINUED] albuterol (PROVENTIL HFA,VENTOLIN HFA) 90 mcg/act inhaler Inhale (Patient not taking: Reported on 6/27/2022)    [DISCONTINUED] bumetanide (BUMEX) 1 mg tablet Take 1 tablet (1 mg total) by mouth daily (Patient not taking: Reported on 6/27/2022)    [DISCONTINUED] cyanocobalamin (VITAMIN B-12) 100 mcg tablet Take by mouth (Patient not taking: Reported on 6/27/2022)    [DISCONTINUED] dexamethasone (DECADRON) 6 mg tablet take 1 tablet by mouth daily for 4 days (Patient not taking: Reported on 6/27/2022)    [DISCONTINUED] ferrous sulfate 325 (65 Fe) mg tablet Take 1 tablet by mouth daily with breakfast   (Patient not taking: Reported on 6/27/2022)    [DISCONTINUED] gabapentin (Neurontin) 300 mg capsule Take 1 capsule (300 mg total) by mouth daily at bedtime for 3 days, THEN 1 capsule (300 mg total) 2 (two) times a day for 3 days, THEN 1 capsule (300 mg total) 3 (three) times a day   (Patient not taking: Reported on 6/27/2022)    [DISCONTINUED] ipratropium-albuterol (COMBIVENT RESPIMAT) inhaler Inhale every 6 (six) hours (Patient not taking: Reported on 6/27/2022)    [DISCONTINUED] nystatin (MYCOSTATIN) powder Apply topically 3 (three) times a day (Patient not taking: Reported on 6/27/2022)    [DISCONTINUED] potassium chloride (Klor-Con) 10 mEq tablet Take 10 mEq by mouth    [DISCONTINUED] solifenacin (VESICARE) 5 mg tablet Take 5 mg by mouth daily    [DISCONTINUED] valsartan-hydrochlorothiazide (DIOVAN-HCT) 160-12 5 MG per tablet Take by mouth (Patient not taking: Reported on 6/27/2022)     No current facility-administered medications on file prior to visit  She has No Known Allergies       Review of Systems   All other systems reviewed and are negative  Objective:      /60   Pulse 68   Temp 98 4 °F (36 9 °C)   Resp 18   Ht 5' 2" (1 575 m)   Wt 75 8 kg (167 lb)   SpO2 94%   BMI 30 54 kg/m²          Physical Exam  Vitals and nursing note reviewed  Constitutional:       Appearance: Normal appearance  She is obese  Cardiovascular:      Rate and Rhythm: Normal rate and regular rhythm  Pulses: Normal pulses  Heart sounds: Normal heart sounds  Pulmonary:      Effort: Pulmonary effort is normal       Breath sounds: Normal breath sounds  Abdominal:      General: Abdomen is flat  Bowel sounds are normal       Palpations: Abdomen is soft  Musculoskeletal:         General: Normal range of motion  Cervical back: Normal range of motion and neck supple  Skin:     General: Skin is warm and dry  Capillary Refill: Capillary refill takes less than 2 seconds  Neurological:      General: No focal deficit present  Mental Status: She is alert and oriented to person, place, and time  Mental status is at baseline  Psychiatric:         Mood and Affect: Mood normal          Behavior: Behavior normal          Thought Content:  Thought content normal          Judgment: Judgment normal

## 2022-07-11 ENCOUNTER — APPOINTMENT (OUTPATIENT)
Dept: LAB | Facility: CLINIC | Age: 82
End: 2022-07-11
Payer: MEDICARE

## 2022-07-11 DIAGNOSIS — N17.9 AKI (ACUTE KIDNEY INJURY) (HCC): ICD-10-CM

## 2022-07-11 DIAGNOSIS — I50.30 HEART FAILURE WITH PRESERVED EJECTION FRACTION, UNSPECIFIED HF CHRONICITY (HCC): ICD-10-CM

## 2022-07-11 DIAGNOSIS — I27.9 CHRONIC PULMONARY HEART DISEASE (HCC): ICD-10-CM

## 2022-07-11 DIAGNOSIS — I25.118 CORONARY ARTERY DISEASE OF NATIVE HEART WITH STABLE ANGINA PECTORIS, UNSPECIFIED VESSEL OR LESION TYPE (HCC): ICD-10-CM

## 2022-07-11 DIAGNOSIS — J44.9 CHRONIC OBSTRUCTIVE PULMONARY DISEASE, UNSPECIFIED COPD TYPE (HCC): ICD-10-CM

## 2022-07-11 DIAGNOSIS — I10 PRIMARY HYPERTENSION: ICD-10-CM

## 2022-07-11 DIAGNOSIS — I50.9 HEART FAILURE, UNSPECIFIED HF CHRONICITY, UNSPECIFIED HEART FAILURE TYPE (HCC): ICD-10-CM

## 2022-07-11 DIAGNOSIS — Z23 ENCOUNTER FOR IMMUNIZATION: ICD-10-CM

## 2022-07-11 DIAGNOSIS — E43 SEVERE MALNUTRITION (HCC): ICD-10-CM

## 2022-07-11 DIAGNOSIS — I27.20 PULMONARY HYPERTENSION (HCC): ICD-10-CM

## 2022-07-11 PROCEDURE — 80061 LIPID PANEL: CPT

## 2022-07-11 PROCEDURE — 36415 COLL VENOUS BLD VENIPUNCTURE: CPT

## 2022-07-11 PROCEDURE — 83880 ASSAY OF NATRIURETIC PEPTIDE: CPT

## 2022-07-11 PROCEDURE — 84443 ASSAY THYROID STIM HORMONE: CPT

## 2022-07-11 PROCEDURE — 80053 COMPREHEN METABOLIC PANEL: CPT

## 2022-07-12 DIAGNOSIS — I50.30 HEART FAILURE WITH PRESERVED EJECTION FRACTION, UNSPECIFIED HF CHRONICITY (HCC): ICD-10-CM

## 2022-07-12 LAB
ALBUMIN SERPL BCP-MCNC: 3.6 G/DL (ref 3.5–5)
ALP SERPL-CCNC: 62 U/L (ref 46–116)
ALT SERPL W P-5'-P-CCNC: 17 U/L (ref 12–78)
ANION GAP SERPL CALCULATED.3IONS-SCNC: 5 MMOL/L (ref 4–13)
AST SERPL W P-5'-P-CCNC: 22 U/L (ref 5–45)
BILIRUB SERPL-MCNC: 0.73 MG/DL (ref 0.2–1)
BUN SERPL-MCNC: 11 MG/DL (ref 5–25)
CALCIUM SERPL-MCNC: 9.5 MG/DL (ref 8.3–10.1)
CHLORIDE SERPL-SCNC: 106 MMOL/L (ref 100–108)
CHOLEST SERPL-MCNC: 128 MG/DL
CO2 SERPL-SCNC: 28 MMOL/L (ref 21–32)
CREAT SERPL-MCNC: 0.85 MG/DL (ref 0.6–1.3)
GFR SERPL CREATININE-BSD FRML MDRD: 64 ML/MIN/1.73SQ M
GLUCOSE P FAST SERPL-MCNC: 79 MG/DL (ref 65–99)
HDLC SERPL-MCNC: 74 MG/DL
LDLC SERPL CALC-MCNC: 38 MG/DL (ref 0–100)
NONHDLC SERPL-MCNC: 54 MG/DL
NT-PROBNP SERPL-MCNC: 1240 PG/ML
POTASSIUM SERPL-SCNC: 4 MMOL/L (ref 3.5–5.3)
PROT SERPL-MCNC: 8 G/DL (ref 6.4–8.2)
SODIUM SERPL-SCNC: 139 MMOL/L (ref 136–145)
TRIGL SERPL-MCNC: 82 MG/DL
TSH SERPL DL<=0.05 MIU/L-ACNC: 1.55 UIU/ML (ref 0.45–4.5)

## 2022-07-12 RX ORDER — FUROSEMIDE 20 MG/1
20 TABLET ORAL 2 TIMES DAILY
Qty: 10 TABLET | Refills: 0 | Status: SHIPPED | OUTPATIENT
Start: 2022-07-12 | End: 2022-07-14

## 2022-07-12 RX ORDER — POTASSIUM CHLORIDE 750 MG/1
10 TABLET, EXTENDED RELEASE ORAL DAILY
Qty: 90 TABLET | Refills: 3 | Status: SHIPPED | OUTPATIENT
Start: 2022-07-12

## 2022-07-12 RX ORDER — POTASSIUM CHLORIDE 20 MEQ/1
20 TABLET, EXTENDED RELEASE ORAL DAILY
Qty: 30 TABLET | Refills: 5 | Status: SHIPPED | OUTPATIENT
Start: 2022-07-12 | End: 2022-07-28 | Stop reason: DRUGHIGH

## 2022-07-14 DIAGNOSIS — I50.30 HEART FAILURE WITH PRESERVED EJECTION FRACTION, UNSPECIFIED HF CHRONICITY (HCC): ICD-10-CM

## 2022-07-14 RX ORDER — FUROSEMIDE 20 MG/1
TABLET ORAL
Qty: 10 TABLET | Refills: 0 | Status: SHIPPED | OUTPATIENT
Start: 2022-07-14 | End: 2022-08-24 | Stop reason: SDUPTHER

## 2022-07-18 ENCOUNTER — TELEPHONE (OUTPATIENT)
Dept: FAMILY MEDICINE CLINIC | Facility: CLINIC | Age: 82
End: 2022-07-18

## 2022-07-18 NOTE — TELEPHONE ENCOUNTER
Pt daughter called in - pt having urinary frequency, burning with urination, and low back pain  Asking for something to be called in to pharmacy  Was also told to call office when she is done taking lasix and is calling in now to report pt has finished lasix script        please call daughter back at [phone]

## 2022-07-19 DIAGNOSIS — R39.9 UTI SYMPTOMS: Primary | ICD-10-CM

## 2022-07-19 RX ORDER — LEVOFLOXACIN 250 MG/1
250 TABLET ORAL DAILY
Qty: 3 TABLET | Refills: 0 | Status: SHIPPED | OUTPATIENT
Start: 2022-07-19 | End: 2022-07-22

## 2022-07-20 NOTE — TELEPHONE ENCOUNTER
Pt daughter called office - states she states pt did well with lasix  Verbalized understanding of levaquin script

## 2022-07-26 ENCOUNTER — TRANSITIONAL CARE MANAGEMENT (OUTPATIENT)
Dept: FAMILY MEDICINE CLINIC | Facility: CLINIC | Age: 82
End: 2022-07-26

## 2022-07-28 ENCOUNTER — OFFICE VISIT (OUTPATIENT)
Dept: FAMILY MEDICINE CLINIC | Facility: CLINIC | Age: 82
End: 2022-07-28
Payer: MEDICARE

## 2022-07-28 VITALS
WEIGHT: 163 LBS | HEART RATE: 76 BPM | HEIGHT: 62 IN | TEMPERATURE: 98.6 F | RESPIRATION RATE: 18 BRPM | OXYGEN SATURATION: 91 % | BODY MASS INDEX: 30 KG/M2 | DIASTOLIC BLOOD PRESSURE: 68 MMHG | SYSTOLIC BLOOD PRESSURE: 118 MMHG

## 2022-07-28 DIAGNOSIS — I50.30 (HFPEF) HEART FAILURE WITH PRESERVED EJECTION FRACTION (HCC): ICD-10-CM

## 2022-07-28 DIAGNOSIS — B37.31 VAGINAL CANDIDA: ICD-10-CM

## 2022-07-28 DIAGNOSIS — N81.9 MIXED URINARY INCONTINENCE DUE TO FEMALE GENITAL PROLAPSE: ICD-10-CM

## 2022-07-28 DIAGNOSIS — N39.46 MIXED URINARY INCONTINENCE DUE TO FEMALE GENITAL PROLAPSE: ICD-10-CM

## 2022-07-28 DIAGNOSIS — Z09 HOSPITAL DISCHARGE FOLLOW-UP: Primary | ICD-10-CM

## 2022-07-28 DIAGNOSIS — S32.030S COMPRESSION FRACTURE OF L3 LUMBAR VERTEBRA, SEQUELA: ICD-10-CM

## 2022-07-28 PROCEDURE — 99496 TRANSJ CARE MGMT HIGH F2F 7D: CPT | Performed by: FAMILY MEDICINE

## 2022-07-28 RX ORDER — LORAZEPAM 1 MG/1
1 TABLET ORAL EVERY 8 HOURS PRN
COMMUNITY

## 2022-07-28 RX ORDER — VITAMIN B COMPLEX
1 CAPSULE ORAL DAILY
COMMUNITY

## 2022-07-28 RX ORDER — NYSTATIN 100000 [USP'U]/G
POWDER TOPICAL 2 TIMES DAILY
Qty: 60 G | Refills: 1 | Status: SHIPPED | OUTPATIENT
Start: 2022-07-28

## 2022-07-28 RX ORDER — GABAPENTIN 100 MG/1
300 CAPSULE ORAL 2 TIMES DAILY
COMMUNITY
End: 2022-09-06 | Stop reason: SDUPTHER

## 2022-07-28 RX ORDER — NYSTATIN 100000 U/G
CREAM TOPICAL 2 TIMES DAILY
Qty: 30 G | Refills: 1 | Status: SHIPPED | OUTPATIENT
Start: 2022-07-28

## 2022-07-28 RX ORDER — NITROGLYCERIN 0.4 MG/1
0.4 TABLET SUBLINGUAL
Qty: 30 TABLET | Refills: 1 | Status: SHIPPED | OUTPATIENT
Start: 2022-07-28

## 2022-07-28 RX ORDER — NYSTATIN 100000 [USP'U]/G
POWDER TOPICAL
COMMUNITY
Start: 2022-07-25 | End: 2022-07-28 | Stop reason: SDUPTHER

## 2022-07-28 NOTE — PROGRESS NOTES
TCM Call     Date and time call was made  7/26/2022 11:41 AM    Patient was hospitialized at  Other (comment)    Comment  Jose Luisyucj Salomon    Date of Admission  07/23/22    Date of discharge  07/25/22    Disposition  Home    Current Symptoms  Back pain - right side    Back pain, right side, severity  Mild    Back pain, right side, onset  Other (comment)      TCM Call     Scheduled for follow up?   Yes    Did you obtain your prescribed medications  No    Why were you unable to obtain your medications  her daughter Deanna Median medication she do a month supply for her    Do you need help managing your prescriptions or medications  No    Is transportation to your appointment needed  No    I have advised the patient to call PCP with any new or worsening symptoms  Parvin Mcnair MA    Living Arrangements  Spouse or Significiant other    Are you recieving any outpatient services  No  not yet someone will contact her    Are you recieving home care services  Yes    Types of home care services  Home health aid    Are you using any community resources  No    Current waiver services  No    Have you fallen in the last 12 months  No    Interperter language line needed  No

## 2022-07-28 NOTE — PROGRESS NOTES
Assessment/Plan:      1  (HFpEF) heart failure with preserved ejection fraction (HCC)  - stable  Asking for NTG to keep on hand  - nitroglycerin (NITROSTAT) 0 4 mg SL tablet; Place 1 tablet (0 4 mg total) under the tongue every 5 (five) minutes as needed for chest pain  Dispense: 30 tablet; Refill: 1     2  Vaginal candida  - her labia are very raw and irritated with red, satellite lesions  We will tx with topical nystatin  This is likely candida 2/2 moisture  She has significant urinary incontinence - mixed  We will use cream for labia and use powder in the inguinal area to see if we can help keep this dry  Will attempt to order Incontinence supplies - pads/Pull-Ups for patient to see if her insurance will cover this - typically they do  I see no e/o UTI - reviewed urine from most recent hospital stay - this is clean  She is describing "burning" 2/2 torn/macerated skin  She is on board with tx plan  - nystatin (MYCOSTATIN) cream; Apply topically 2 (two) times a day  Dispense: 30 g; Refill: 1  - nystatin powder; Apply topically 2 (two) times a day  Dispense: 60 g; Refill: 1     3  Hospital discharge follow-up  - she is with her LSO brace and has PT services in place  She is stable in this regard  Pain is controlled with Tylenol        4  Compression fracture of L3 lumbar vertebra, sequela  - see above        5  Mixed urinary incontinence due to female genital prolapse  - we will attempt to get her Incontinence supplies covered through insurance  Per chart review, she has seen Yadira Bowers at [facility] and has not been since 3/2021  She is to be on Vesicare -- she is not sure if she does have this but will check  She will let me know and if not, we can send for her  Per note, she was having some improved symptoms on this  She was urged to return to see them if her symptoms are worsening and she is on Vesicare    Discussed imp of staying dry to avoid secondary infection          Total time I spent caring for this patient on the day of the encounter - 60 minutes  15 minutes spent before the visit  45 minutes spent during the visit  0 minutes spent after the visit - day of               Chief Complaint   Patient presents with    Transition of Care Management       TCM    Follow-up       Does not remember if she had dexa before    Possible UTI       Is having burning and discomfot in vaginal area thinks she may have blister there? Had been treated for this in ER per pt         Subjective:    Patient ID: Morales Shaw is a 79 y o  female  She is here with her   They live on [address] here in [place]  They do have daughter and DIL that help them        She is here today for hospital follow up  She was sent home with a brace for her compression fracture  She is getting  PT and they plan to come back tomorrow  She is wearing this brace and this does help  Takes Tylenol for pain and this is adequate        She is most upset about her burning in her vagina and worried this may be a UTI  Tells me in the past she was told she had UTIs but was not symptomatic  She is tearful with burning when she urinates and wipes  No pelvic pain  Tells me it burns as it is "coming out "  She has incontinence and is wearing a pad and keeps wet despite trying to change pads  Incontinence has gotten worse over late  No f/c/s, n/v/d        Otherwise feels, stable  DISCHARGE DIAGNOSES:   Active Hospital Problems   Diagnosis    *Principal Diagnosis - Closed compression fracture of L3 lumbar vertebra, initial encounter ([facility] [address] )    Heart failure (HCC)    S/P CABG x 2     Resolved Hospital Problems   No resolved problems to display       ADMISSION HISTORY & PHYSICAL EXAM (focused):   Per Admission H&P:  Azra Mandel is a 79year old female with a PMH of coronary artery disease S/P CABG, heart failure with preserved ejection fraction (grade 2 diastolic dysfunction), diabetes mellitus type 2, COPD, gastrointestinal bleeding, AFib/flutter not on anticoagulation, hypertension and hyperlipidemia who presents to the hospital due to complaints of chronic lower back pain which has been worsening for the past few days  Patient reports that she has been in a lot of pain lately specifically when she lifts her legs up and moves them around these past few days  Today it was especially worse when she was unable to get out of bed  She reports that her  has been helping her at home move her legs  She has been ambulating lesser than normal also secondary to the pain  Described a dull ache in the lower back, reports it occasionally radiates into the right leg  She mentions an episode 3 years ago when she fell off a mattress and was told she has an L3 compression fracture  XR Lumbar spine this admission in ER showed worsening of the L3 fracture  Denies any new falls  ER team discussed with ortho and it was recommended to give her an LSO brace  She denies any saddle anesthesia, bowel/bladder incontinence  Denies chest pain  Denies abdominal pain  Denies nausea or vomiting  Denies fevers/chills  Constitutional: NAD, AAO x 3  Neck: Supple, Normal ROM  Eyes: Sclera normal, PERRLA  Cardiovascular: RRR, no murmurs/rubs/gallops appreciated  Pulmonary: Lungs clear to auscultation bilaterally, No rales/rhonchi/wheezes appreciated  GI: Nontender to palpation, normal bowel sounds, soft   Musculoskeletal: Straight leg raise positive on right, ROM limitation of right hip secondary to pain  Extremities: No lower extremity edema, no clubbing  Skin: Appears warm and dry  Neurological: AAO x 3, No gross focal neurologic deficit on exam       HOSPITAL COURSE (focused):   Patient was admitted due to worsening lower back pain over the course of the last few days  XR lumbar spine showed evidence of progression of compression fracture of L3 (which patient says she sustained 3 years ago)   Orthopedics was contacted and recommended LSO brace  She was evaluated by PT/OT and wore LSO brace which helped her ambulate  She was discharged in stable health  Operations & Procedures: none  Complications: none applicable  Significant Lab and Imaging Results:   XR L spine  FINDINGS  Bones are demineralized  There are marked degenerative changes and an exaggerated lumbar lordosis  There is compression fracture at L3 which has progressed  There is no subluxation  Calcified atherosclerotic plaque is seen in the aorta and branch vessels  There is a gallstone in the right upper quadrant      IMPRESSION  IMPRESSION  Interval progression of compression fracture of L3  Test Results Still Pending at Discharge: none    MEDICATIONS:     MEDICATION UPDATES AT [address] taking these medications but follow up with your Primary Care Physician (PCP)  INSTRUCTIONS   Aricept 5 MG Tablet  Generic drug: donepezil    Take by mouth 5 mg in the morning  Take at bedtime   aspirin enteric coated 81 MG Tbec    Take 1 Tab by mouth daily  Ativan 1 MG Tablet  Generic drug: LORazepam    Take 1 mg by mouth every 8 hours as needed  atorvaSTATin 20 MG Tablet  Commonly known as: Lipitor    Take by mouth 40 mg daily   Gabapentin 100 MG Capsule  Commonly known as: Neurontin    Take by mouth 300 mg 2 times a day   Klor-Con 10 10 MEQ Tbcr  Generic drug: potassium chloride ER    Take by mouth 20 mEq daily   Lasix 20 MG Tablet  Generic drug: Furosemide    Take by mouth 20 mg in the morning AND 20 mg before bedtime  Vitamin B Complex 100 Inj    Inject 100 mg as directed Every Month  Vitamin B-12 1000 MCG Tablet    Take 1,000 mcg by mouth daily  Vitamin D 50 MCG (2000 UT) Tablet    Take 2,000 Units by mouth daily  SCHEDULED FOLLOW-UP:  Future Appointments   This patient does not currently have any appointments scheduled  OTHER INFORMATION:   Vital Signs (last recorded):    Most Recent Systolic BP: 048 mmHg (47/93/75 2229)  Most Recent Diastolic BP: 73 mmHg (78/70/51 2229)  Pulse: 71 (07/24/22 2229)  Resp: 16 (07/24/22 1855)  Most Recent Temperature: 36 72 C (07/24/22 2229)  Weight: 73 2 kg (161 lb 6 oz) (07/24/22 0456)  SpO2: 93 % (07/24/22 2229)  O2 flow rate: 0 L/MIN (07/24/22 0800)  Allergies: Patient has no known allergies  Activity: as tolerated  Diet: age appropriate diet  Code status (this admission): No Code  Discussion of advance directives occurred with: Patient  Does patient have living will: No  Does patient have health care power of : No    Condition on Discharge: stable  Indwelling Devices: none  Quintana Fall Scale: Fall Score: 50 (07/24/22 2000) Reference range: 0-24=minimal fall risk; 25-50=moderate fall risk; greater than 50=high fall risk  Isolation status: None  Cognition: normal    CONSULTS ORDERED:   ADULT PHYSICAL THERAPY CONSULT IP  ADULT OCCUPATIONAL THERAPY CONSULT IP  WOUND/OSTOMY CONSULT IP    REFERRING PHYSICIAN:   Ref: [id number]  NO STREET ADDRESS AVAILABLE  None (office)  None (fax)    PRIMARY CARE PROVIDER:   PCP: Larry Richardson MD  [address] [address]  [phone] (office)  [phone] (fax)    Note: To contact a physician responsible for this patients hospital care, please call Dilip Sky at [phone]  I spent a total of 35 minutes providing discharge day management for this patient  The management included final examination of the patient, discussion of the hospital stay with the patient and/or caregivers, and preparation of discharge records, prescriptions, and referral forms that relate to the patient's hospital stay  This time is reflective of my part in the discharge day management as documented in the summary and notes, and doesn't include resident or other providers time providing the above to the patient     Electronically signed by Salvatore Pope MD at 07/25/2022 6:01 PM EDT        Review of Systems   All other systems reviewed and are negative          Objective: Physical Exam  Vitals and nursing note reviewed  Constitutional:       General: She is not in acute distress  Appearance: She is not ill-appearing  Comments: Tearful due to "burning" she is experience in her vaginal area   HENT:      Head: Normocephalic and atraumatic  Eyes:      Conjunctiva/sclera: Conjunctivae normal    Cardiovascular:      Rate and Rhythm: Normal rate and regular rhythm  Pulmonary:      Effort: Pulmonary effort is normal       Breath sounds: Normal breath sounds  No wheezing, rhonchi or rales  Genitourinary:     Comments: Inguinal areas b/l are red with satellite lesions  Labia with redness and tearing, near the urethra  Thre was wet toiletpaper soiled with stool when initiating exam   She had a pad that was saturated with urine and a pull-up on  Musculoskeletal:      Cervical back: Neck supple  Comments: Walking with a walker, with her LSO brace on  Very unsteady initially on her feet but did ok with the assistance of her  as well    Skin:     General: Skin is warm and dry  Neurological:      General: No focal deficit present  Mental Status: She is alert and oriented to person, place, and time  Psychiatric:         Mood and Affect: Mood normal          Behavior: Behavior normal          Thought Content: Thought content normal          Judgment: Judgment normal            Vitals:    07/28/22 1211   BP: 118/68   Pulse: 76   Resp: 18   Temp: 98 6 °F (37 °C)   SpO2: 91%   Weight: 73 9 kg (163 lb)   Height: 5' 2" (1 575 m)       Transitional Care Management Review:  Osman Pardo is a 79 y o  female here for TCM follow up       During the TCM phone call patient stated:    TCM Call     Date and time call was made  7/26/2022 11:41 AM    Patient was hospitialized at  Other (comment)    Comment  Diana Latif    Date of Admission  07/23/22    Date of discharge  07/25/22    Disposition  Home    Current Symptoms  Back pain - right side    Back pain, right side, severity  Mild    Back pain, right side, onset  Other (comment)      TCM Call     Scheduled for follow up?   Yes    Did you obtain your prescribed medications  No    Why were you unable to obtain your medications  her daughter Edu Malagon medication she do a month supply for her    Do you need help managing your prescriptions or medications  No    Is transportation to your appointment needed  No    I have advised the patient to call PCP with any new or worsening symptoms  Rosy Chavarria MA    Living Arrangements  Spouse or Significiant other    Are you recieving any outpatient services  No  not yet someone will contact her    Are you recieving home care services  Yes    Types of home care services  Home health aid    Are you using any community resources  No    Current waiver services  No    Have you fallen in the last 12 months  No    Interperter language line needed  No          Sveta Shravan Delilah, DO

## 2022-08-01 ENCOUNTER — TELEPHONE (OUTPATIENT)
Dept: FAMILY MEDICINE CLINIC | Facility: CLINIC | Age: 82
End: 2022-08-01

## 2022-08-01 NOTE — TELEPHONE ENCOUNTER
[address] occupational therapist from [facility] calling to let you know she is recommending continued services for OT and they would be sending a order over for you to sign

## 2022-08-02 ENCOUNTER — TELEPHONE (OUTPATIENT)
Dept: FAMILY MEDICINE CLINIC | Facility: CLINIC | Age: 82
End: 2022-08-02

## 2022-08-19 ENCOUNTER — TELEPHONE (OUTPATIENT)
Dept: FAMILY MEDICINE CLINIC | Facility: CLINIC | Age: 82
End: 2022-08-19

## 2022-08-19 DIAGNOSIS — R39.9 UTI SYMPTOMS: Primary | ICD-10-CM

## 2022-08-19 RX ORDER — CIPROFLOXACIN 250 MG/1
250 TABLET, FILM COATED ORAL EVERY 12 HOURS SCHEDULED
Qty: 6 TABLET | Refills: 0 | Status: SHIPPED | OUTPATIENT
Start: 2022-08-19 | End: 2022-08-22

## 2022-08-23 ENCOUNTER — TELEPHONE (OUTPATIENT)
Dept: FAMILY MEDICINE CLINIC | Facility: CLINIC | Age: 82
End: 2022-08-23

## 2022-08-23 NOTE — TELEPHONE ENCOUNTER
Pt daughter called and said that mom has two water pill one is lasix, but do not know which pills she suppose to be taking

## 2022-08-24 DIAGNOSIS — I50.30 HEART FAILURE WITH PRESERVED EJECTION FRACTION, UNSPECIFIED HF CHRONICITY (HCC): ICD-10-CM

## 2022-08-24 RX ORDER — FUROSEMIDE 20 MG/1
20 TABLET ORAL 2 TIMES DAILY
Qty: 60 TABLET | Refills: 5 | Status: SHIPPED | OUTPATIENT
Start: 2022-08-24

## 2022-08-24 NOTE — TELEPHONE ENCOUNTER
Requested medication(s) are due for refill today: Yes  Patient has already received a courtesy refill: No  Other reason request has been forwarded to provider: passed protocol but was asking question

## 2022-08-24 NOTE — TELEPHONE ENCOUNTER
Pt daughter said she has two bottles of lasix one say 1mg and other say 20 mg which one should she be taking

## 2022-08-24 NOTE — TELEPHONE ENCOUNTER
Called pt daughter Doron Friends to give her the correct medication did not get an answer left vm for pt daughter to give the office an call

## 2022-08-30 ENCOUNTER — TELEPHONE (OUTPATIENT)
Dept: FAMILY MEDICINE CLINIC | Facility: CLINIC | Age: 82
End: 2022-08-30

## 2022-08-30 NOTE — TELEPHONE ENCOUNTER
Marcelle Núñez from [place] [id number] would like a discharge order for OT for the pt for the week of 09/12

## 2022-09-03 DIAGNOSIS — K21.9 GASTROESOPHAGEAL REFLUX DISEASE: ICD-10-CM

## 2022-09-06 DIAGNOSIS — S32.030S COMPRESSION FRACTURE OF L3 LUMBAR VERTEBRA, SEQUELA: Primary | ICD-10-CM

## 2022-09-06 RX ORDER — GABAPENTIN 100 MG/1
300 CAPSULE ORAL 2 TIMES DAILY
Qty: 180 CAPSULE | Refills: 3 | Status: SHIPPED | OUTPATIENT
Start: 2022-09-06

## 2022-09-06 RX ORDER — OMEPRAZOLE 40 MG/1
CAPSULE, DELAYED RELEASE ORAL
Qty: 90 CAPSULE | Refills: 3 | Status: SHIPPED | OUTPATIENT
Start: 2022-09-06

## 2022-09-15 ENCOUNTER — TELEPHONE (OUTPATIENT)
Dept: FAMILY MEDICINE CLINIC | Facility: CLINIC | Age: 82
End: 2022-09-15

## 2022-09-15 NOTE — TELEPHONE ENCOUNTER
Micki Savage from  [facility] called and wanted to let you know that pt cancel OT visit for today and will like to reschedule for next and she also wanted to let you know pt said she fell 9/13 she said she missed some steps

## 2022-09-20 ENCOUNTER — TRANSITIONAL CARE MANAGEMENT (OUTPATIENT)
Dept: FAMILY MEDICINE CLINIC | Facility: CLINIC | Age: 82
End: 2022-09-20

## 2022-10-11 ENCOUNTER — OFFICE VISIT (OUTPATIENT)
Dept: FAMILY MEDICINE CLINIC | Facility: CLINIC | Age: 82
End: 2022-10-11
Payer: MEDICARE

## 2022-10-11 VITALS
WEIGHT: 159 LBS | SYSTOLIC BLOOD PRESSURE: 90 MMHG | HEART RATE: 59 BPM | BODY MASS INDEX: 29.08 KG/M2 | OXYGEN SATURATION: 90 % | RESPIRATION RATE: 18 BRPM | TEMPERATURE: 97.2 F | DIASTOLIC BLOOD PRESSURE: 58 MMHG

## 2022-10-11 DIAGNOSIS — I50.30 (HFPEF) HEART FAILURE WITH PRESERVED EJECTION FRACTION (HCC): ICD-10-CM

## 2022-10-11 DIAGNOSIS — Z09 HOSPITAL DISCHARGE FOLLOW-UP: Primary | ICD-10-CM

## 2022-10-11 DIAGNOSIS — M80.00XS AGE-RELATED OSTEOPOROSIS WITH CURRENT PATHOLOGICAL FRACTURE, SEQUELA: ICD-10-CM

## 2022-10-11 DIAGNOSIS — K21.9 GASTROESOPHAGEAL REFLUX DISEASE: ICD-10-CM

## 2022-10-11 DIAGNOSIS — I10 ESSENTIAL HYPERTENSION: ICD-10-CM

## 2022-10-11 DIAGNOSIS — R73.03 PRE-DIABETES: ICD-10-CM

## 2022-10-11 DIAGNOSIS — S32.000S CLOSED TRAUMATIC COMPRESSION FRACTURE OF LUMBAR VERTEBRA, SEQUELA: ICD-10-CM

## 2022-10-11 PROBLEM — S32.000A CLOSED TRAUMATIC COMPRESSION FRACTURE OF LUMBAR VERTEBRA (HCC): Status: ACTIVE | Noted: 2022-10-11

## 2022-10-11 PROBLEM — M80.00XA AGE-RELATED OSTEOPOROSIS WITH CURRENT PATHOLOGICAL FRACTURE: Status: ACTIVE | Noted: 2017-06-20

## 2022-10-11 PROBLEM — E11.9 ENCOUNTER FOR DIABETIC FOOT EXAM (HCC): Status: RESOLVED | Noted: 2019-06-10 | Resolved: 2022-10-11

## 2022-10-11 PROCEDURE — 99496 TRANSJ CARE MGMT HIGH F2F 7D: CPT | Performed by: FAMILY MEDICINE

## 2022-10-11 RX ORDER — CYANOCOBALAMIN 1000 UG/ML
1000 INJECTION, SOLUTION INTRAMUSCULAR; SUBCUTANEOUS
Qty: 10 ML | Refills: 0
Start: 2022-10-11

## 2022-10-11 NOTE — PROGRESS NOTES
Assessment & Plan     1  Essential hypertension  - her BP is LOW  She was on bid Metoprolol but has been taking once/day  Will cont only once/day  Will also ask them to keep watch of this and for persistently low, STOP bid lasix and just do AM dose and let us know  She is not symptomatic  We will closely monitor this  They are on board!!    - metoprolol tartrate (LOPRESSOR) 25 mg tablet; Take 1 tablet (25 mg total) by mouth in the morning  Dispense: 90 tablet; Refill: 0  - cyanocobalamin 1,000 mcg/mL; Inject 1 mL (1,000 mcg total) into a muscle every 30 (thirty) days  Dispense: 10 mL; Refill: 0    2  Hospital discharge follow-up  - was actually d/c'd yesterday from SNF  Paperwork reviewed that they brought, I did not see a d/c summary from the facility  Labs reviewed in the system, done recently  Nothing to acutely recheck  Pain is controlled with BID oxycodone, did take three times yesterday due to being up and moving a bit more  They will let us know when/if she needs a refill  Is also on gabapentin for radiculopathy - this is not new  It is on for 300 mg po bid, she does not like to take this but 200 mg po bid  Can update us at her f/u visit  3  Closed traumatic compression fracture of lumbar vertebra, sequela  - stable  In her brace today  4  Pre-diabetes  - up from prior checks  Has been under a lot of stress  Cont to monitor  Lab Results   Component Value Date    HGBA1C 6 1 (H) 09/21/2022       5  (HFpEF) heart failure with preserved ejection fraction (HCC)  - stable  Age-related osteoporosis with current pathological fracture, sequela  - does not take meds other than Vit D for this  Has been on calcitonin in the past, has not been taking this  RTC as scheduled in Nov with her PCP  Patient/Caretaker verbalized understanding and were in agreement with today's assessment and plan  Time was taken to address any questions patient/caretaker had  Indication/Risks/Benefits of medication(s) as prescribed were discussed with the patient/caretaker  The patient verbalized understanding and agreement and elects to take medications as prescribed  Time was taken to answer any questions the patient/caretaker may have had  Chief Complaint   Patient presents with   • Transition of Care Management       Subjective     Transitional Care Management Review:   John Greer is a 79 y o  female here for TCM follow up  Was at [facility] for SNF after her hospital stay for intractable pain and debility for her compression fx - she sustained this in September -- 9/13/22  She feels a bit stronger and they have [facility] scheduled for tomorrow  She is taking oxycodone for her pain  This is bid dosing  She appears stable in this regard, now that she is up moving around, the pain is a bit more significant  They did give her 3 of those  She is in some pain, some radiating down her legs  She is weak but using her rollator and is overall doing much better  She prefers to be at home  She and her  live at home, she has family close by  Her DIL is with her today as well as her   During the TCM phone call patient stated:  TCM Call     Date and time call was made  10/10/2022  2:32 PM    Patient was hospitialized at  Other (comment)    [address] [place] [address]    Date of Admission  09/15/22    Date of discharge  10/11/22    Disposition  [facility]    Current Symptoms  Back pain - right side    Back pain, right side, severity  Mild    Back pain, right side, onset  Other (comment)      TCM Call     Scheduled for follow up?   Yes    Did you obtain your prescribed medications  Yes    Why were you unable to obtain your medications  her daughter Flaco Hubbard medication she do a month supply for her    Do you need help managing your prescriptions or medications  Yes    Is transportation to your appointment needed  No    I have advised the patient to call PCP with any new or worsening symptoms  Nallely Marte MA    Living Arrangements  Alone    Are you recieving any outpatient services  No    Are you recieving home care services  No    Types of home care services  Home health aid    Are you using any community resources  No    Current waiver services  No    Have you fallen in the last 12 months  No    Interperter language line needed  No    Counseling  Patient    Comments  pt went to rehab so will contact when pt get discharge      Review of Systems   All other systems reviewed and are negative  Objective     BP 90/58 (BP Location: Left arm, Patient Position: Sitting, Cuff Size: Standard)   Pulse 59   Temp (!) 97 2 °F (36 2 °C) (Temporal)   Resp 18   Wt 72 1 kg (159 lb)   SpO2 90%   BMI 29 08 kg/m²      Physical Exam  Vitals and nursing note reviewed  Constitutional:       Appearance: She is not ill-appearing  HENT:      Head: Normocephalic and atraumatic  Eyes:      Conjunctiva/sclera: Conjunctivae normal       Pupils: Pupils are equal, round, and reactive to light  Cardiovascular:      Rate and Rhythm: Regular rhythm  Comments: Edinson rate  Trace, non pitting LE edema    Pulmonary:      Effort: Pulmonary effort is normal       Breath sounds: Normal breath sounds  No wheezing or rhonchi  Musculoskeletal:      Cervical back: Neck supple  Comments: Using her rollator  She has her lumbar support brace on  She is kyphotic    Lymphadenopathy:      Cervical: No cervical adenopathy  Skin:     General: Skin is warm and dry  Neurological:      General: No focal deficit present  Mental Status: She is alert and oriented to person, place, and time  Psychiatric:         Mood and Affect: Mood normal          Behavior: Behavior normal          Thought Content:  Thought content normal          Judgment: Judgment normal        Sveta Allsion DO

## 2022-10-11 NOTE — Clinical Note
PT TELLS ME HER D/C WAS YESTERDAY  TCM HAS THIS BEING ON 9/20    WE MAY NEED TO UPDATE THE TCM TEMPLATE  Sveta Silva, DO

## 2022-10-20 DIAGNOSIS — E53.8 B12 DEFICIENCY: ICD-10-CM

## 2022-11-08 ENCOUNTER — TELEPHONE (OUTPATIENT)
Dept: FAMILY MEDICINE CLINIC | Facility: CLINIC | Age: 82
End: 2022-11-08

## 2022-11-08 NOTE — TELEPHONE ENCOUNTER
[facility] stating [facility] are going to see her 1 more time next week and then discharging her from a nursing stand point   PT will still be seeing here

## 2022-11-16 ENCOUNTER — TELEPHONE (OUTPATIENT)
Dept: FAMILY MEDICINE CLINIC | Facility: CLINIC | Age: 82
End: 2022-11-16

## 2022-11-16 NOTE — TELEPHONE ENCOUNTER
[facility] and said they were out for reassessment of patient and they are recommending discharge for patient   They will be discharging pt from OT services the week of 11/28

## 2022-11-18 ENCOUNTER — OFFICE VISIT (OUTPATIENT)
Dept: FAMILY MEDICINE CLINIC | Facility: CLINIC | Age: 82
End: 2022-11-18

## 2022-11-18 VITALS
RESPIRATION RATE: 16 BRPM | TEMPERATURE: 98 F | BODY MASS INDEX: 28.72 KG/M2 | OXYGEN SATURATION: 98 % | DIASTOLIC BLOOD PRESSURE: 68 MMHG | WEIGHT: 157 LBS | HEART RATE: 66 BPM | SYSTOLIC BLOOD PRESSURE: 112 MMHG

## 2022-11-18 DIAGNOSIS — I10 PRIMARY HYPERTENSION: ICD-10-CM

## 2022-11-18 DIAGNOSIS — M79.672 LEFT FOOT PAIN: ICD-10-CM

## 2022-11-18 DIAGNOSIS — Z23 NEEDS FLU SHOT: ICD-10-CM

## 2022-11-18 DIAGNOSIS — J44.9 CHRONIC OBSTRUCTIVE PULMONARY DISEASE, UNSPECIFIED COPD TYPE (HCC): ICD-10-CM

## 2022-11-18 DIAGNOSIS — I50.9 HEART FAILURE, UNSPECIFIED HF CHRONICITY, UNSPECIFIED HEART FAILURE TYPE (HCC): ICD-10-CM

## 2022-11-18 DIAGNOSIS — S32.030S COMPRESSION FRACTURE OF L3 LUMBAR VERTEBRA, SEQUELA: Primary | ICD-10-CM

## 2022-11-18 DIAGNOSIS — F11.20 CONTINUOUS OPIOID DEPENDENCE (HCC): ICD-10-CM

## 2022-11-18 DIAGNOSIS — F03.90 DEMENTIA WITHOUT BEHAVIORAL DISTURBANCE, PSYCHOTIC DISTURBANCE, MOOD DISTURBANCE, OR ANXIETY, UNSPECIFIED DEMENTIA SEVERITY, UNSPECIFIED DEMENTIA TYPE (HCC): ICD-10-CM

## 2022-11-18 DIAGNOSIS — A08.4 GASTROENTERITIS AND COLITIS, VIRAL: ICD-10-CM

## 2022-11-18 DIAGNOSIS — I27.20 PULMONARY HYPERTENSION (HCC): ICD-10-CM

## 2022-11-18 DIAGNOSIS — E43 SEVERE MALNUTRITION (HCC): ICD-10-CM

## 2022-11-18 DIAGNOSIS — N18.30 STAGE 3 CHRONIC KIDNEY DISEASE, UNSPECIFIED WHETHER STAGE 3A OR 3B CKD (HCC): ICD-10-CM

## 2022-11-18 DIAGNOSIS — I10 ESSENTIAL HYPERTENSION: ICD-10-CM

## 2022-11-18 DIAGNOSIS — I25.118 CORONARY ARTERY DISEASE OF NATIVE HEART WITH STABLE ANGINA PECTORIS, UNSPECIFIED VESSEL OR LESION TYPE (HCC): ICD-10-CM

## 2022-11-18 DIAGNOSIS — I50.30 HEART FAILURE WITH PRESERVED EJECTION FRACTION, UNSPECIFIED HF CHRONICITY (HCC): ICD-10-CM

## 2022-11-18 DIAGNOSIS — I27.9 CHRONIC PULMONARY HEART DISEASE (HCC): ICD-10-CM

## 2022-11-18 DIAGNOSIS — N17.9 AKI (ACUTE KIDNEY INJURY) (HCC): ICD-10-CM

## 2022-11-18 RX ORDER — OXYCODONE HYDROCHLORIDE AND ACETAMINOPHEN 5; 325 MG/1; MG/1
1 TABLET ORAL EVERY 4 HOURS PRN
Qty: 90 TABLET | Refills: 0 | Status: SHIPPED | OUTPATIENT
Start: 2022-11-18

## 2022-11-18 RX ORDER — HYDROCHLOROTHIAZIDE 25 MG/1
25 TABLET ORAL DAILY
Qty: 90 TABLET | Refills: 3 | Status: SHIPPED | OUTPATIENT
Start: 2022-11-18

## 2022-11-18 RX ORDER — LIDOCAINE 50 MG/G
1 PATCH TOPICAL DAILY
Qty: 30 PATCH | Refills: 5 | Status: SHIPPED | OUTPATIENT
Start: 2022-11-18

## 2022-11-18 NOTE — PROGRESS NOTES
Name: Rafael Azul      : 1940      MRN: [id number]  Encounter Provider: Rene Sánchez MD  Encounter Date: 2022   Encounter department: [address] [place] [address]     1  Compression fracture of L3 lumbar vertebra, sequela  -     oxyCODONE-acetaminophen (Percocet) 5-325 mg per tablet; Take 1 tablet by mouth every 4 (four) hours as needed for moderate pain Max Daily Amount: 6 tablets    2  Dementia without behavioral disturbance, psychotic disturbance, mood disturbance, or anxiety, unspecified dementia severity, unspecified dementia type ([place] [address] )    3  Essential hypertension  -     hydrochlorothiazide (HYDRODIURIL) 25 mg tablet; Take 1 tablet (25 mg total) by mouth daily    4  Needs flu shot  -     influenza vaccine, high-dose, PF 0 7 mL (FLUZONE HIGH-DOSE)    5  Left foot pain  -     XR foot 3+ vw left; Future; Expected date: 2022  -     lidocaine (Lidoderm) 5 %; Apply 1 patch topically daily Remove & Discard patch within 12 hours or as directed by MD  -     Diclofenac Sodium (VOLTAREN) 1 %; Apply 4 g topically 4 (four) times a day    6  Heart failure with preserved ejection fraction, unspecified HF chronicity ([email or web address] )    7  Gastroenteritis and colitis, viral    8  Chronic obstructive pulmonary disease, unspecified COPD type ([email or web address] )    9  Coronary artery 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, unspecified vessel or lesion type ([email or web address] 75 )    10  Heart failure, unspecified HF chronicity, unspecified heart failure type ([email or web address] 75 )    11  Primary hypertension    12  Pulmonary hypertension ([email or web address] 75 )    13  Chronic pulmonary heart disease ([email or web address] 75 )    14  Severe malnutrition ([email or web address] 75 )    15  ANGEL (acute kidney injury) ([place] [address] )    16  Stage 3 chronic kidney disease, unspecified whether stage 3a or 3b CKD ([place] [address] )    17  Continuous opioid dependence ([place] [address] )           Subjective      She has low back pain related to a compression fracture  Tylenol does not help    She cannot take NSAIDs secondary toe stage 3 CKD and a h/o GI bleed  She is getting PT with some benefit  Her BP is at goal on her current regimen  She has no CP or SOB  She has no HA or vision changes  She has impaired fasting glucose  She has no s/s of T2DM  Review of Systems   All other systems reviewed and are negative        Current Outpatient Medications on File Prior to Visit   Medication Sig   • aspirin 81 MG tablet Take 81 mg by mouth daily   • atorvastatin (LIPITOR) 20 mg tablet Take 2 tablets (40 mg total) by mouth daily   • b complex vitamins capsule Take 1 capsule by mouth daily   • Cholecalciferol (D3-1000) 25 MCG (1000 UT) tablet Take 2 tablets (2,000 Units total) by mouth daily   • cyanocobalamin 1,000 mcg/mL Inject 1 mL (1,000 mcg total) into a muscle every 30 (thirty) days   • folic acid (FOLVITE) 884 mcg tablet Take 1 tablet (400 mcg total) by mouth daily   • gabapentin (NEURONTIN) 100 mg capsule Take 3 capsules (300 mg total) by mouth 2 (two) times a day   • Incontinence Supply Disposable (DEPEND EASY FIT UNDERGARMENTS) MISC by Does not apply route 3 (three) times a day   • lidocaine (Lidoderm) 5 % Apply 1 patch topically daily Remove & Discard patch within 12 hours or as directed by MD   • LORazepam (ATIVAN) 1 mg tablet Take 1 mg by mouth every 8 (eight) hours as needed for anxiety   • metoprolol tartrate (LOPRESSOR) 25 mg tablet Take 1 tablet (25 mg total) by mouth in the morning   • nitroglycerin (NITROSTAT) 0 4 mg SL tablet Place 1 tablet (0 4 mg total) under the tongue every 5 (five) minutes as needed for chest pain   • nystatin (MYCOSTATIN) cream Apply topically 2 (two) times a day   • nystatin powder Apply topically 2 (two) times a day   • omeprazole (PriLOSEC) 40 MG capsule TAKE 1 CAPSULE DAILY   • potassium chloride (K-DUR,KLOR-CON) 10 mEq tablet Take 1 tablet (10 mEq total) by mouth daily (Patient taking differently: Take 20 mEq by mouth daily)   • solifenacin (VESICARE) 10 MG tablet Take 10 mg by mouth daily   • B-D 3CC LUER-MARISABEL SYR 25GX5/8" 25G X 5/8" 3 ML MISC USE EVERY 30 DAYS AS DIRECTED   • calcitonin, salmon, (MIACALCIN) 200 units/act nasal spray 1 spray into each nostril daily (Patient not taking: Reported on 11/18/2022)   • donepezil (ARICEPT) 5 mg tablet Take 1 tablet (5 mg total) by mouth daily at bedtime       Objective     /68 (BP Location: Right arm, Patient Position: Sitting, Cuff Size: Standard)   Pulse 66   Temp 98 °F (36 7 °C) (Tympanic Core)   Resp 16   Wt 71 2 kg (157 lb)   SpO2 98%   BMI 28 72 kg/m²     Physical Exam  Vitals and nursing note reviewed  Constitutional:       Appearance: Normal appearance  Cardiovascular:      Rate and Rhythm: Normal rate and regular rhythm  Pulses: Normal pulses  Heart sounds: Normal heart sounds  Pulmonary:      Effort: Pulmonary effort is normal       Breath sounds: Normal breath sounds  Abdominal:      General: Abdomen is flat  Bowel sounds are normal       Palpations: Abdomen is soft  Musculoskeletal:         General: Normal range of motion  Cervical back: Normal range of motion and neck supple  Skin:     General: Skin is warm and dry  Capillary Refill: Capillary refill takes less than 2 seconds  Neurological:      General: No focal deficit present  Mental Status: She is alert and oriented to person, place, and time  Mental status is at baseline  Psychiatric:         Mood and Affect: Mood normal          Behavior: Behavior normal          Thought Content:  Thought content normal          Judgment: Judgment normal        Saeid Garcia MD

## 2022-12-09 DIAGNOSIS — R39.9 UTI SYMPTOMS: Primary | ICD-10-CM

## 2022-12-10 RX ORDER — SOLIFENACIN SUCCINATE 10 MG/1
10 TABLET, FILM COATED ORAL DAILY
Qty: 90 TABLET | Refills: 3 | Status: SHIPPED | OUTPATIENT
Start: 2022-12-10

## 2022-12-31 DIAGNOSIS — R41.3 MEMORY LOSS: ICD-10-CM

## 2023-01-03 RX ORDER — DONEPEZIL HYDROCHLORIDE 5 MG/1
TABLET, FILM COATED ORAL
Qty: 90 TABLET | Refills: 3 | Status: SHIPPED | OUTPATIENT
Start: 2023-01-03

## 2023-02-09 DIAGNOSIS — I50.30 (HFPEF) HEART FAILURE WITH PRESERVED EJECTION FRACTION (HCC): ICD-10-CM

## 2023-02-09 DIAGNOSIS — K21.9 GASTROESOPHAGEAL REFLUX DISEASE: ICD-10-CM

## 2023-02-09 DIAGNOSIS — R39.9 UTI SYMPTOMS: ICD-10-CM

## 2023-02-09 DIAGNOSIS — I10 ESSENTIAL HYPERTENSION: ICD-10-CM

## 2023-02-09 RX ORDER — CYANOCOBALAMIN 1000 UG/ML
1000 INJECTION, SOLUTION INTRAMUSCULAR; SUBCUTANEOUS
Qty: 10 ML | Refills: 0 | Status: SHIPPED | OUTPATIENT
Start: 2023-02-09

## 2023-03-11 DIAGNOSIS — S32.030S COMPRESSION FRACTURE OF L3 LUMBAR VERTEBRA, SEQUELA: ICD-10-CM

## 2023-03-13 RX ORDER — GABAPENTIN 100 MG/1
CAPSULE ORAL
Qty: 540 CAPSULE | Refills: 3 | Status: SHIPPED | OUTPATIENT
Start: 2023-03-13

## 2023-03-16 DIAGNOSIS — I50.30 HEART FAILURE WITH PRESERVED EJECTION FRACTION, UNSPECIFIED HF CHRONICITY (HCC): ICD-10-CM

## 2023-03-16 DIAGNOSIS — R39.9 UTI SYMPTOMS: ICD-10-CM

## 2023-03-17 RX ORDER — POTASSIUM CHLORIDE 750 MG/1
20 TABLET, EXTENDED RELEASE ORAL DAILY
Qty: 90 TABLET | Refills: 3 | Status: SHIPPED | OUTPATIENT
Start: 2023-03-17

## 2023-03-17 RX ORDER — SOLIFENACIN SUCCINATE 10 MG/1
10 TABLET, FILM COATED ORAL DAILY
Qty: 90 TABLET | Refills: 3 | Status: SHIPPED | OUTPATIENT
Start: 2023-03-17

## 2023-03-22 ENCOUNTER — TELEPHONE (OUTPATIENT)
Dept: FAMILY MEDICINE CLINIC | Facility: CLINIC | Age: 83
End: 2023-03-22

## 2023-03-22 NOTE — TELEPHONE ENCOUNTER
Pt daughter asking for Physical therapy for her   If you can place referral  Will fax it to [facility] in [place]

## 2023-03-23 DIAGNOSIS — R26.2 AMBULATORY DYSFUNCTION: Primary | ICD-10-CM

## 2023-03-31 DIAGNOSIS — I50.30 HEART FAILURE WITH PRESERVED EJECTION FRACTION, UNSPECIFIED HF CHRONICITY (HCC): ICD-10-CM

## 2023-04-02 DIAGNOSIS — I10 ESSENTIAL HYPERTENSION: ICD-10-CM

## 2023-04-02 RX ORDER — POTASSIUM CHLORIDE 750 MG/1
20 TABLET, EXTENDED RELEASE ORAL DAILY
Qty: 180 TABLET | Refills: 3 | Status: SHIPPED | OUTPATIENT
Start: 2023-04-02

## 2023-04-03 RX ORDER — ATORVASTATIN CALCIUM 20 MG/1
TABLET, FILM COATED ORAL
Qty: 180 TABLET | Refills: 3 | Status: SHIPPED | OUTPATIENT
Start: 2023-04-03

## 2023-04-21 ENCOUNTER — OFFICE VISIT (OUTPATIENT)
Dept: FAMILY MEDICINE CLINIC | Facility: CLINIC | Age: 83
End: 2023-04-21

## 2023-04-21 VITALS
OXYGEN SATURATION: 95 % | SYSTOLIC BLOOD PRESSURE: 137 MMHG | BODY MASS INDEX: 29.04 KG/M2 | DIASTOLIC BLOOD PRESSURE: 59 MMHG | WEIGHT: 158.8 LBS | RESPIRATION RATE: 18 BRPM | TEMPERATURE: 98.3 F | HEART RATE: 51 BPM

## 2023-04-21 DIAGNOSIS — J44.9 CHRONIC OBSTRUCTIVE PULMONARY DISEASE, UNSPECIFIED COPD TYPE (HCC): Primary | ICD-10-CM

## 2023-04-21 DIAGNOSIS — I50.30 (HFPEF) HEART FAILURE WITH PRESERVED EJECTION FRACTION (HCC): ICD-10-CM

## 2023-04-21 DIAGNOSIS — F03.90 DEMENTIA WITHOUT BEHAVIORAL DISTURBANCE, PSYCHOTIC DISTURBANCE, MOOD DISTURBANCE, OR ANXIETY, UNSPECIFIED DEMENTIA SEVERITY, UNSPECIFIED DEMENTIA TYPE (HCC): ICD-10-CM

## 2023-04-21 DIAGNOSIS — I10 PRIMARY HYPERTENSION: ICD-10-CM

## 2023-04-21 DIAGNOSIS — S32.030S COMPRESSION FRACTURE OF L3 LUMBAR VERTEBRA, SEQUELA: ICD-10-CM

## 2023-04-21 DIAGNOSIS — R73.03 PRE-DIABETES: ICD-10-CM

## 2023-04-21 DIAGNOSIS — R39.9 UTI SYMPTOMS: ICD-10-CM

## 2023-04-21 DIAGNOSIS — K21.9 GASTROESOPHAGEAL REFLUX DISEASE: ICD-10-CM

## 2023-04-21 DIAGNOSIS — E43 SEVERE MALNUTRITION (HCC): ICD-10-CM

## 2023-04-21 DIAGNOSIS — I27.9 CHRONIC PULMONARY HEART DISEASE (HCC): ICD-10-CM

## 2023-04-21 DIAGNOSIS — I27.20 PULMONARY HYPERTENSION (HCC): ICD-10-CM

## 2023-04-21 DIAGNOSIS — I50.9 HEART FAILURE, UNSPECIFIED HF CHRONICITY, UNSPECIFIED HEART FAILURE TYPE (HCC): ICD-10-CM

## 2023-04-21 DIAGNOSIS — S32.000S CLOSED TRAUMATIC COMPRESSION FRACTURE OF LUMBAR VERTEBRA, SEQUELA: ICD-10-CM

## 2023-04-21 DIAGNOSIS — I10 ESSENTIAL HYPERTENSION: ICD-10-CM

## 2023-04-21 DIAGNOSIS — F11.20 CONTINUOUS OPIOID DEPENDENCE (HCC): ICD-10-CM

## 2023-04-21 DIAGNOSIS — I50.30 HEART FAILURE WITH PRESERVED EJECTION FRACTION, UNSPECIFIED HF CHRONICITY (HCC): ICD-10-CM

## 2023-04-21 DIAGNOSIS — N18.30 STAGE 3 CHRONIC KIDNEY DISEASE, UNSPECIFIED WHETHER STAGE 3A OR 3B CKD (HCC): ICD-10-CM

## 2023-04-21 DIAGNOSIS — I25.118 CORONARY ARTERY DISEASE OF NATIVE HEART WITH STABLE ANGINA PECTORIS, UNSPECIFIED VESSEL OR LESION TYPE (HCC): ICD-10-CM

## 2023-04-21 RX ORDER — SOLIFENACIN SUCCINATE 10 MG/1
10 TABLET, FILM COATED ORAL DAILY
Qty: 90 TABLET | Refills: 3 | Status: SHIPPED | OUTPATIENT
Start: 2023-04-21

## 2023-04-21 RX ORDER — CYANOCOBALAMIN 1000 UG/ML
1000 INJECTION, SOLUTION INTRAMUSCULAR; SUBCUTANEOUS
Qty: 10 ML | Refills: 0 | Status: SHIPPED | OUTPATIENT
Start: 2023-04-21

## 2023-04-21 RX ORDER — ATORVASTATIN CALCIUM 40 MG/1
40 TABLET, FILM COATED ORAL DAILY
Qty: 90 TABLET | Refills: 3 | Status: SHIPPED | OUTPATIENT
Start: 2023-04-21

## 2023-04-21 RX ORDER — OMEPRAZOLE 40 MG/1
40 CAPSULE, DELAYED RELEASE ORAL DAILY
Qty: 90 CAPSULE | Refills: 3 | Status: SHIPPED | OUTPATIENT
Start: 2023-04-21

## 2023-04-24 NOTE — PROGRESS NOTES
Name: Ivan Cheung      : 1940      MRN: [id number]  Encounter Provider: Kashif Foreman MD  Encounter Date: 2023   Encounter department: [address] [place] [address]     1  Chronic obstructive pulmonary disease, unspecified COPD type (HCC)  -     atorvastatin (LIPITOR) 40 mg tablet; Take 1 tablet (40 mg total) by mouth daily  -     Lipid panel; Future  -     Comprehensive metabolic panel; Future; Expected date: 2023  -     TSH, 3rd generation with Free T4 reflex; Future  -     HEMOGLOBIN A1C W/ EAG ESTIMATION; Future  -     CBC and differential; Future  -     NT-BNP PRO; Future    2  Heart failure with preserved ejection fraction, unspecified HF chronicity (HCC)  -     atorvastatin (LIPITOR) 40 mg tablet; Take 1 tablet (40 mg total) by mouth daily  -     Lipid panel; Future  -     Comprehensive metabolic panel; Future; Expected date: 2023  -     TSH, 3rd generation with Free T4 reflex; Future  -     HEMOGLOBIN A1C W/ EAG ESTIMATION; Future  -     CBC and differential; Future  -     NT-BNP PRO; Future    3  Coronary artery 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, unspecified vessel or lesion type (HCC)  -     atorvastatin (LIPITOR) 40 mg tablet; Take 1 tablet (40 mg total) by mouth daily  -     Lipid panel; Future  -     Comprehensive metabolic panel; Future; Expected date: 2023  -     TSH, 3rd generation with Free T4 reflex; Future  -     HEMOGLOBIN A1C W/ EAG ESTIMATION; Future  -     CBC and differential; Future  -     NT-BNP PRO; Future    4  Heart failure, unspecified HF chronicity, unspecified heart failure type (HCC)  -     atorvastatin (LIPITOR) 40 mg tablet; Take 1 tablet (40 mg total) by mouth daily  -     Lipid panel; Future  -     Comprehensive metabolic panel; Future; Expected date: 2023  -     TSH, 3rd generation with Free T4 reflex; Future  -     HEMOGLOBIN A1C W/ EAG ESTIMATION;  Future  -     CBC and differential; Future  -     NT-BNP PRO; Future    5  Primary hypertension  -     atorvastatin (LIPITOR) 40 mg tablet; Take 1 tablet (40 mg total) by mouth daily  -     Lipid panel; Future  -     Comprehensive metabolic panel; Future; Expected date: 04/22/2023  -     TSH, 3rd generation with Free T4 reflex; Future  -     HEMOGLOBIN A1C W/ EAG ESTIMATION; Future  -     CBC and differential; Future  -     NT-BNP PRO; Future    6  Pulmonary hypertension (HCC)  -     atorvastatin (LIPITOR) 40 mg tablet; Take 1 tablet (40 mg total) by mouth daily  -     Lipid panel; Future  -     Comprehensive metabolic panel; Future; Expected date: 04/22/2023  -     TSH, 3rd generation with Free T4 reflex; Future  -     HEMOGLOBIN A1C W/ EAG ESTIMATION; Future  -     CBC and differential; Future  -     NT-BNP PRO; Future    7  Chronic pulmonary heart disease (HCC)  -     atorvastatin (LIPITOR) 40 mg tablet; Take 1 tablet (40 mg total) by mouth daily  -     Lipid panel; Future  -     Comprehensive metabolic panel; Future; Expected date: 04/22/2023  -     TSH, 3rd generation with Free T4 reflex; Future  -     HEMOGLOBIN A1C W/ EAG ESTIMATION; Future  -     CBC and differential; Future  -     NT-BNP PRO; Future    8  Compression fracture of L3 lumbar vertebra, sequela  -     atorvastatin (LIPITOR) 40 mg tablet; Take 1 tablet (40 mg total) by mouth daily  -     Lipid panel; Future  -     Comprehensive metabolic panel; Future; Expected date: 04/22/2023  -     TSH, 3rd generation with Free T4 reflex; Future  -     HEMOGLOBIN A1C W/ EAG ESTIMATION; Future  -     CBC and differential; Future  -     NT-BNP PRO; Future    9  Closed traumatic compression fracture of lumbar vertebra, sequela  -     atorvastatin (LIPITOR) 40 mg tablet; Take 1 tablet (40 mg total) by mouth daily  -     Lipid panel; Future  -     Comprehensive metabolic panel; Future; Expected date: 04/22/2023  -     TSH, 3rd generation with Free T4 reflex;  Future  -     HEMOGLOBIN A1C W/ EAG ESTIMATION; Future  -     CBC and differential; Future  -     NT-BNP PRO; Future    10  Stage 3 chronic kidney disease, unspecified whether stage 3a or 3b CKD (HCC)  -     atorvastatin (LIPITOR) 40 mg tablet; Take 1 tablet (40 mg total) by mouth daily  -     Lipid panel; Future  -     Comprehensive metabolic panel; Future; Expected date: 04/22/2023  -     TSH, 3rd generation with Free T4 reflex; Future  -     HEMOGLOBIN A1C W/ EAG ESTIMATION; Future  -     CBC and differential; Future  -     NT-BNP PRO; Future    11  Continuous opioid dependence (HCC)  -     atorvastatin (LIPITOR) 40 mg tablet; Take 1 tablet (40 mg total) by mouth daily  -     Lipid panel; Future  -     Comprehensive metabolic panel; Future; Expected date: 04/22/2023  -     TSH, 3rd generation with Free T4 reflex; Future  -     HEMOGLOBIN A1C W/ EAG ESTIMATION; Future  -     CBC and differential; Future  -     NT-BNP PRO; Future    12  Essential hypertension  -     atorvastatin (LIPITOR) 40 mg tablet; Take 1 tablet (40 mg total) by mouth daily  -     cyanocobalamin 1,000 mcg/mL; Inject 1 mL (1,000 mcg total) into a muscle every 30 (thirty) days  -     Lipid panel; Future  -     Comprehensive metabolic panel; Future; Expected date: 04/22/2023  -     TSH, 3rd generation with Free T4 reflex; Future  -     HEMOGLOBIN A1C W/ EAG ESTIMATION; Future  -     CBC and differential; Future  -     NT-BNP PRO; Future    13  UTI symptoms  -     atorvastatin (LIPITOR) 40 mg tablet; Take 1 tablet (40 mg total) by mouth daily  -     solifenacin (VESICARE) 10 MG tablet; Take 1 tablet (10 mg total) by mouth daily  -     Lipid panel; Future  -     Comprehensive metabolic panel; Future; Expected date: 04/22/2023  -     TSH, 3rd generation with Free T4 reflex; Future  -     HEMOGLOBIN A1C W/ EAG ESTIMATION; Future  -     CBC and differential; Future  -     NT-BNP PRO; Future    14  Gastroesophageal reflux disease  -     atorvastatin (LIPITOR) 40 mg tablet;  Take 1 tablet (40 mg total) by mouth daily  -     omeprazole (PriLOSEC) 40 MG capsule; Take 1 capsule (40 mg total) by mouth daily  -     cyanocobalamin 1,000 mcg/mL; Inject 1 mL (1,000 mcg total) into a muscle every 30 (thirty) days  -     Lipid panel; Future  -     Comprehensive metabolic panel; Future; Expected date: 04/22/2023  -     TSH, 3rd generation with Free T4 reflex; Future  -     HEMOGLOBIN A1C W/ EAG ESTIMATION; Future  -     CBC and differential; Future  -     NT-BNP PRO; Future    15  (HFpEF) heart failure with preserved ejection fraction (HCC)  -     atorvastatin (LIPITOR) 40 mg tablet; Take 1 tablet (40 mg total) by mouth daily  -     cyanocobalamin 1,000 mcg/mL; Inject 1 mL (1,000 mcg total) into a muscle every 30 (thirty) days  -     Lipid panel; Future  -     Comprehensive metabolic panel; Future; Expected date: 04/22/2023  -     TSH, 3rd generation with Free T4 reflex; Future  -     HEMOGLOBIN A1C W/ EAG ESTIMATION; Future  -     CBC and differential; Future  -     NT-BNP PRO; Future    16  Severe malnutrition ([facility] 75 )  -     Lipid panel; Future  -     Comprehensive metabolic panel; Future; Expected date: 04/22/2023  -     TSH, 3rd generation with Free T4 reflex; Future  -     HEMOGLOBIN A1C W/ EAG ESTIMATION; Future  -     CBC and differential; Future  -     NT-BNP PRO; Future    17  Dementia without behavioral disturbance, psychotic disturbance, mood disturbance, or anxiety, unspecified dementia severity, unspecified dementia type ([facility] 75 )  -     Lipid panel; Future  -     Comprehensive metabolic panel; Future; Expected date: 04/22/2023  -     TSH, 3rd generation with Free T4 reflex; Future  -     HEMOGLOBIN A1C W/ EAG ESTIMATION; Future  -     CBC and differential; Future  -     NT-BNP PRO; Future    18  Pre-diabetes  -     HEMOGLOBIN A1C W/ EAG ESTIMATION; Future  -     NT-BNP PRO; Future           Subjective      She has IFG  She has no s/s of T2DM  Her BP is at goal   She is slightly bradycardic    She has "no syncope or near syncope  She has no CP or SOB  Review of Systems   All other systems reviewed and are negative        Current Outpatient Medications on File Prior to Visit   Medication Sig   • aspirin 81 MG tablet Take 81 mg by mouth daily   • b complex vitamins capsule Take 1 capsule by mouth daily   • B-D 3CC LUER-MARISABEL SYR 25GX5/8\" 25G X 5/8\" 3 ML MISC USE EVERY 30 DAYS AS DIRECTED   • Cholecalciferol (D3-1000) 25 MCG (1000 UT) tablet Take 2 tablets (2,000 Units total) by mouth daily   • Diclofenac Sodium (VOLTAREN) 1 % Apply 4 g topically 4 (four) times a day   • donepezil (ARICEPT) 5 mg tablet take 1 tablet by mouth at bedtime   • folic acid (FOLVITE) 130 mcg tablet Take 1 tablet (400 mcg total) by mouth daily   • gabapentin (NEURONTIN) 100 mg capsule TAKE 3 CAPSULES TWICE A DAY   • hydrochlorothiazide (HYDRODIURIL) 25 mg tablet Take 1 tablet (25 mg total) by mouth daily   • Incontinence Supply Disposable (DEPEND EASY FIT UNDERGARMENTS) MISC by Does not apply route 3 (three) times a day   • lidocaine (Lidoderm) 5 % Apply 1 patch topically daily Remove & Discard patch within 12 hours or as directed by MD   • LORazepam (ATIVAN) 1 mg tablet Take 1 mg by mouth every 8 (eight) hours as needed for anxiety   • metoprolol tartrate (LOPRESSOR) 25 mg tablet Take 1 tablet (25 mg total) by mouth in the morning   • nitroglycerin (NITROSTAT) 0 4 mg SL tablet Place 1 tablet (0 4 mg total) under the tongue every 5 (five) minutes as needed for chest pain   • oxyCODONE-acetaminophen (Percocet) 5-325 mg per tablet Take 1 tablet by mouth every 4 (four) hours as needed for moderate pain Max Daily Amount: 6 tablets   • potassium chloride (K-DUR,KLOR-CON) 10 mEq tablet Take 2 tablets (20 mEq total) by mouth daily   • calcitonin, salmon, (MIACALCIN) 200 units/act nasal spray 1 spray into each nostril daily (Patient not taking: Reported on 11/18/2022)   • lidocaine (Lidoderm) 5 % Apply 1 patch topically daily Remove & Discard patch " within 12 hours or as directed by MD   • nystatin (MYCOSTATIN) cream Apply topically 2 (two) times a day (Patient not taking: Reported on 4/21/2023)   • nystatin powder Apply topically 2 (two) times a day (Patient not taking: Reported on 4/21/2023)       Objective     /59 (BP Location: Left arm, Patient Position: Sitting, Cuff Size: Large)   Pulse (!) 51   Temp 98 3 °F (36 8 °C) (Tympanic)   Resp 18   Wt 72 kg (158 lb 12 8 oz)   SpO2 95%   BMI 29 04 kg/m²     Physical Exam  Vitals and nursing note reviewed  Constitutional:       Appearance: Normal appearance  Cardiovascular:      Rate and Rhythm: Normal rate and regular rhythm  Pulses: Normal pulses  Heart sounds: Normal heart sounds  Pulmonary:      Effort: Pulmonary effort is normal       Breath sounds: Normal breath sounds  Abdominal:      General: Abdomen is flat  Bowel sounds are normal       Palpations: Abdomen is soft  Musculoskeletal:         General: Normal range of motion  Cervical back: Normal range of motion and neck supple  Skin:     General: Skin is warm and dry  Capillary Refill: Capillary refill takes less than 2 seconds  Neurological:      General: No focal deficit present  Mental Status: She is alert and oriented to person, place, and time  Mental status is at baseline  Psychiatric:         Mood and Affect: Mood normal          Behavior: Behavior normal          Thought Content:  Thought content normal          Judgment: Judgment normal        Theo Hashimoto, MD

## 2023-04-25 LAB — HBA1C MFR BLD HPLC: 5.6 %

## 2023-06-12 DIAGNOSIS — M54.50 CHRONIC BILATERAL LOW BACK PAIN WITHOUT SCIATICA: ICD-10-CM

## 2023-06-12 DIAGNOSIS — G89.29 CHRONIC BILATERAL LOW BACK PAIN WITHOUT SCIATICA: ICD-10-CM

## 2023-06-12 RX ORDER — LIDOCAINE 50 MG/G
1 PATCH TOPICAL DAILY
Qty: 30 PATCH | Refills: 5 | Status: SHIPPED | OUTPATIENT
Start: 2023-06-12

## 2023-06-19 DIAGNOSIS — F41.9 ANXIETY: Primary | ICD-10-CM

## 2023-06-19 RX ORDER — LORAZEPAM 1 MG/1
1 TABLET ORAL EVERY 8 HOURS PRN
Qty: 90 TABLET | Refills: 5 | Status: SHIPPED | OUTPATIENT
Start: 2023-06-19 | End: 2023-06-22 | Stop reason: SDUPTHER

## 2023-06-20 ENCOUNTER — TELEPHONE (OUTPATIENT)
Dept: FAMILY MEDICINE CLINIC | Facility: CLINIC | Age: 83
End: 2023-06-20

## 2023-06-22 DIAGNOSIS — F41.9 ANXIETY: ICD-10-CM

## 2023-06-22 RX ORDER — LORAZEPAM 1 MG/1
1 TABLET ORAL EVERY 8 HOURS PRN
Qty: 90 TABLET | Refills: 5 | Status: SHIPPED | OUTPATIENT
Start: 2023-06-22

## 2023-07-17 DIAGNOSIS — I27.20 PULMONARY HYPERTENSION (HCC): ICD-10-CM

## 2023-07-17 DIAGNOSIS — Z59.41 FOOD INSECURITY: ICD-10-CM

## 2023-07-17 DIAGNOSIS — J44.9 CHRONIC OBSTRUCTIVE PULMONARY DISEASE, UNSPECIFIED COPD TYPE (HCC): Primary | ICD-10-CM

## 2023-07-17 DIAGNOSIS — I50.30 HEART FAILURE WITH PRESERVED EJECTION FRACTION, UNSPECIFIED HF CHRONICITY (HCC): ICD-10-CM

## 2023-07-17 DIAGNOSIS — E43 SEVERE MALNUTRITION (HCC): ICD-10-CM

## 2023-07-17 SDOH — ECONOMIC STABILITY - FOOD INSECURITY: FOOD INSECURITY: Z59.41

## 2023-07-18 ENCOUNTER — TELEPHONE (OUTPATIENT)
Dept: FAMILY MEDICINE CLINIC | Facility: CLINIC | Age: 83
End: 2023-07-18

## 2023-07-18 ENCOUNTER — PATIENT OUTREACH (OUTPATIENT)
Dept: FAMILY MEDICINE CLINIC | Facility: CLINIC | Age: 83
End: 2023-07-18

## 2023-07-18 NOTE — PROGRESS NOTES
OP CM called to pt in regards to SDOH referral/food insecurity. Pt did not answer and voicemail is not set up so unable to leave message. Message sent to pt through [facility].

## 2023-07-18 NOTE — TELEPHONE ENCOUNTER
Chhaya Dickens from [facility] calling they need a more recent office note. The one from April is too old.  Asking if you need services for her before her August appt or if it can hold off until you see her the end of August. If she needs services sooner I will just have to schedule her to come in one afternoon with you

## 2023-07-18 NOTE — LETTER
July 18, 9732     [address]    Patient: Edgar Parson   YOB: 1940   Date of Visit:        Dear Ms. Koch:    I am the  that covers [employer]. They have us reach out to patients after yearly visits to see if you need assistance with any  such as: transportation, home health aides, food insecurities, and outpatient mental health services. I can be reached at [phone] Monday to Friday from 8am to 430 or via email. Thank you,     Alissa Campuzano, MSW, [address] [place] [address]  [phone]  Baljinder Lofton. [email or web address]

## 2023-07-19 ENCOUNTER — PATIENT OUTREACH (OUTPATIENT)
Dept: FAMILY MEDICINE CLINIC | Facility: CLINIC | Age: 83
End: 2023-07-19

## 2023-07-19 NOTE — PROGRESS NOTES
OP CM called to pt again in regards to [facility] referral.  Pt does not have a VM set up so message already sent through [facility]

## 2023-08-28 ENCOUNTER — OFFICE VISIT (OUTPATIENT)
Dept: FAMILY MEDICINE CLINIC | Facility: CLINIC | Age: 83
End: 2023-08-28
Payer: MEDICARE

## 2023-08-28 VITALS
DIASTOLIC BLOOD PRESSURE: 68 MMHG | HEART RATE: 60 BPM | RESPIRATION RATE: 16 BRPM | TEMPERATURE: 98.1 F | HEIGHT: 62 IN | SYSTOLIC BLOOD PRESSURE: 128 MMHG | BODY MASS INDEX: 28.45 KG/M2 | WEIGHT: 154.6 LBS | OXYGEN SATURATION: 95 %

## 2023-08-28 DIAGNOSIS — R32 URINARY INCONTINENCE, UNSPECIFIED TYPE: ICD-10-CM

## 2023-08-28 DIAGNOSIS — N81.9 MIXED URINARY INCONTINENCE DUE TO FEMALE GENITAL PROLAPSE: Primary | ICD-10-CM

## 2023-08-28 DIAGNOSIS — N39.46 MIXED URINARY INCONTINENCE DUE TO FEMALE GENITAL PROLAPSE: Primary | ICD-10-CM

## 2023-08-28 PROCEDURE — 99213 OFFICE O/P EST LOW 20 MIN: CPT | Performed by: FAMILY MEDICINE

## 2023-08-28 NOTE — PROGRESS NOTES
Name: Caryl Long      : 1940      MRN: [id number]  Encounter Provider: Ferny Martinez MD  Encounter Date: 2023   Encounter department: [address] [place] [address]     1. Mixed urinary incontinence due to female genital prolapse  -     solifenacin (VESICARE) 10 MG tablet; Take 1 tablet (10 mg total) by mouth daily    2. Urinary incontinence, unspecified type  -     solifenacin (VESICARE) 10 MG tablet; Take 1 tablet (10 mg total) by mouth daily      BMI Counseling: Body mass index is 28.28 kg/m². The BMI is above normal. Nutrition recommendations include encouraging healthy choices of fruits and vegetables, increasing intake of lean protein and reducing intake of saturated and trans fat. Exercise recommendations include exercising 3-5 times per week. No pharmacotherapy was ordered. Rationale for BMI follow-up plan is due to patient being overweight or obese. Depression Screening and Follow-up Plan: Patient was screened for depression during today's encounter. They screened negative with a PHQ-2 score of 0. Subjective      Mrs Scarlett Bello is an 79y F w a PMH of CKD, CAD, COPD, HTN, HLD, dementia, and mixed urinary incontinence, brought to clinic with her  by one of their daughters for medication review, and because of her urinary incontinence. Her daughter and I reviewed her medication list, verifying dosing and frequency. She reports that the patient believed her medication for incontinence was another diuretic, and so the patient was not taking it for the past few months. The patient resumed taking this med about 1wk prior, but notes no improvement in symptoms of frequency, urgency, and heavy leakage. The patients daughter is concerned the patient will develop UTIs, as the patient is adding toilet tissue over her pad, which is worn underneath adult diapers, because the patient reports leaking beyond the pad and diaper alone.  We discussed discussed initiating Vesicare 10mg QD for relief of symptoms, and patient was counseled on proper vaginal hygiene. Of note, the patients daughter expressed her concerns for her parents current living situation, as her father is her mothers main caretaker in the home the two occupy together. She believes her parents are struggling to keep up with the home, and adequately care for one another. While the patient her self is tearful when disucussing other living arrangements, all were agreeable to a referral being place to Social work, so they may begin to review their options. The referral was placed under Mr Cynthia visit, on behalf of the couple. Review of Systems   Constitutional: Negative for activity change, appetite change, chills, fatigue and fever. HENT: Negative for ear pain and sore throat. Eyes: Negative for pain and visual disturbance. Respiratory: Negative for cough, chest tightness, shortness of breath and wheezing. Cardiovascular: Negative for chest pain and palpitations. Gastrointestinal: Negative for abdominal pain, constipation, diarrhea, nausea and vomiting. Genitourinary: Positive for frequency and urgency. Negative for dysuria, flank pain and hematuria. Musculoskeletal: Positive for arthralgias and gait problem. Skin: Negative for color change and rash. Neurological: Positive for weakness. Negative for seizures, syncope and headaches. Psychiatric/Behavioral: Positive for confusion. Negative for agitation and sleep disturbance. All other systems reviewed and are negative.       Current Outpatient Medications on File Prior to Visit   Medication Sig   • aspirin 81 MG tablet Take 81 mg by mouth daily   • atorvastatin (LIPITOR) 40 mg tablet Take 1 tablet (40 mg total) by mouth daily   • B-D 3CC LUER-MARISABEL SYR 25GX5/8" 25G X 5/8" 3 ML MISC USE EVERY 30 DAYS AS DIRECTED   • Cholecalciferol (D3-1000) 25 MCG (1000 UT) tablet Take 2 tablets (2,000 Units total) by mouth daily   • cyanocobalamin 1,000 mcg/mL Inject 1 mL (1,000 mcg total) into a muscle every 30 (thirty) days   • donepezil (ARICEPT) 5 mg tablet take 1 tablet by mouth at bedtime   • folic acid (FOLVITE) 280 mcg tablet Take 1 tablet (400 mcg total) by mouth daily   • gabapentin (NEURONTIN) 100 mg capsule TAKE 3 CAPSULES TWICE A DAY   • hydrochlorothiazide (HYDRODIURIL) 25 mg tablet Take 1 tablet (25 mg total) by mouth daily   • Incontinence Supply Disposable (DEPEND EASY FIT UNDERGARMENTS) MISC by Does not apply route 3 (three) times a day   • lidocaine (Lidoderm) 5 % Apply 1 patch topically over 12 hours daily Remove & Discard patch within 12 hours or as directed by MD   • LORazepam (ATIVAN) 1 mg tablet Take 1 tablet (1 mg total) by mouth every 8 (eight) hours as needed for anxiety   • metoprolol tartrate (LOPRESSOR) 25 mg tablet Take 1 tablet (25 mg total) by mouth in the morning   • nitroglycerin (NITROSTAT) 0.4 mg SL tablet Place 1 tablet (0.4 mg total) under the tongue every 5 (five) minutes as needed for chest pain   • omeprazole (PriLOSEC) 40 MG capsule Take 1 capsule (40 mg total) by mouth daily   • oxyCODONE-acetaminophen (Percocet) 5-325 mg per tablet Take 1 tablet by mouth every 4 (four) hours as needed for moderate pain Max Daily Amount: 6 tablets   • potassium chloride (K-DUR,KLOR-CON) 10 mEq tablet Take 2 tablets (20 mEq total) by mouth daily   • b complex vitamins capsule Take 1 capsule by mouth daily   • calcitonin, salmon, (MIACALCIN) 200 units/act nasal spray 1 spray into each nostril daily (Patient not taking: Reported on 11/18/2022)   • Diclofenac Sodium (VOLTAREN) 1 % Apply 4 g topically 4 (four) times a day   • lidocaine (Lidoderm) 5 % Apply 1 patch topically daily Remove & Discard patch within 12 hours or as directed by MD   • nystatin (MYCOSTATIN) cream Apply topically 2 (two) times a day (Patient not taking: Reported on 4/21/2023)   • nystatin powder Apply topically 2 (two) times a day (Patient not taking: Reported on 4/21/2023)       Objective     /68   Pulse 60   Temp 98.1 °F (36.7 °C) (Temporal)   Resp 16   Ht 5' 2" (1.575 m)   Wt 70.1 kg (154 lb 9.6 oz)   SpO2 95%   BMI 28.28 kg/m²     Physical Exam  Vitals and nursing note reviewed. Constitutional:       General: She is not in acute distress. Appearance: Normal appearance. She is not ill-appearing. HENT:      Head: Normocephalic and atraumatic. Right Ear: External ear normal.      Left Ear: External ear normal.      Nose: Nose normal.      Mouth/Throat:      Mouth: Mucous membranes are moist.      Pharynx: Oropharynx is clear. Eyes:      Extraocular Movements: Extraocular movements intact. Conjunctiva/sclera: Conjunctivae normal.   Cardiovascular:      Rate and Rhythm: Normal rate and regular rhythm. Pulses: Normal pulses. Heart sounds: Normal heart sounds. Pulmonary:      Effort: Pulmonary effort is normal.      Breath sounds: Normal breath sounds. No wheezing. Abdominal:      General: Abdomen is flat. Bowel sounds are normal.      Palpations: Abdomen is soft. Tenderness: There is no abdominal tenderness. There is no guarding. Musculoskeletal:         General: No swelling or deformity. Normal range of motion. Cervical back: Normal range of motion and neck supple. No rigidity. Skin:     General: Skin is warm and dry. Findings: No erythema or rash. Neurological:      General: No focal deficit present. Mental Status: She is alert and oriented to person, place, and time. Mental status is at baseline. Motor: Weakness present.       Gait: Gait abnormal.   Psychiatric:         Mood and Affect: Mood normal.         Behavior: Behavior normal.       Annemarie Davenport MD

## 2023-08-31 PROBLEM — R41.0 DELIRIUM: Status: RESOLVED | Noted: 2017-08-19 | Resolved: 2023-08-31

## 2023-08-31 PROBLEM — Z00.00 MEDICARE ANNUAL WELLNESS VISIT, SUBSEQUENT: Status: RESOLVED | Noted: 2019-06-10 | Resolved: 2023-08-31

## 2023-08-31 RX ORDER — SOLIFENACIN SUCCINATE 10 MG/1
10 TABLET, FILM COATED ORAL DAILY
Qty: 90 TABLET | Refills: 3 | Status: SHIPPED | OUTPATIENT
Start: 2023-08-31

## 2023-11-09 DIAGNOSIS — I10 ESSENTIAL HYPERTENSION: ICD-10-CM

## 2023-11-09 RX ORDER — HYDROCHLOROTHIAZIDE 25 MG/1
25 TABLET ORAL DAILY
Qty: 90 TABLET | Refills: 3 | Status: SHIPPED | OUTPATIENT
Start: 2023-11-09

## 2023-12-04 ENCOUNTER — RA CDI HCC (OUTPATIENT)
Dept: OTHER | Facility: HOSPITAL | Age: 83
End: 2023-12-04

## 2023-12-04 NOTE — PROGRESS NOTES
[facility] [address] [facility] coding opportunities          Chart Reviewed number of suggestions sent to Provider: 1     Patients Insurance   [id number]  Medicare Insurance: [employer]

## 2023-12-07 ENCOUNTER — TELEPHONE (OUTPATIENT)
Dept: FAMILY MEDICINE CLINIC | Facility: CLINIC | Age: 83
End: 2023-12-07

## 2023-12-07 NOTE — TELEPHONE ENCOUNTER
Rodolfo Boyer from [address] [place] [address] called to give you an update on the patient. She said she has gained weight and is now 148 lbs. She has no shortness of breath and her lungs are clear. However, the patient did tell her that she hasn't had a bowel movement in a week. She also said the patient is pleasantly confused so she isn't sure if that is accurate or not.

## 2023-12-11 ENCOUNTER — TELEPHONE (OUTPATIENT)
Dept: FAMILY MEDICINE CLINIC | Facility: CLINIC | Age: 83
End: 2023-12-11

## 2023-12-11 NOTE — TELEPHONE ENCOUNTER
Pt fell 2 days ago  rt knee and buttock are sore and rt thumb ecchymotic area.   Home calling in to make provider aware,

## 2023-12-12 ENCOUNTER — TELEPHONE (OUTPATIENT)
Dept: FAMILY MEDICINE CLINIC | Facility: CLINIC | Age: 83
End: 2023-12-12

## 2023-12-12 NOTE — TELEPHONE ENCOUNTER
Received a call from Niru from [facility].  She wanted to make the provider aware that pt's weight is now 147.8 lbs

## 2023-12-20 ENCOUNTER — TELEPHONE (OUTPATIENT)
Dept: FAMILY MEDICINE CLINIC | Facility: CLINIC | Age: 83
End: 2023-12-20

## 2023-12-20 NOTE — TELEPHONE ENCOUNTER
Pt called in very upset saying that in a few minutes she will be home alone with her  as her care giver is leaving. She would like a call back to discuss ER visit to [facility] yesterday morning. Recent fall.

## 2024-02-01 ENCOUNTER — TELEPHONE (OUTPATIENT)
Dept: FAMILY MEDICINE CLINIC | Facility: CLINIC | Age: 84
End: 2024-02-01

## 2024-02-01 NOTE — TELEPHONE ENCOUNTER
Patient's  called back. Katie is currently in physical rehabilitation, he will call to schedule AWV when she is home

## 2024-02-21 PROBLEM — N39.0 ACUTE LOWER UTI: Status: RESOLVED | Noted: 2017-07-21 | Resolved: 2024-02-21

## 2024-02-21 PROBLEM — A08.4 GASTROENTERITIS AND COLITIS, VIRAL: Status: RESOLVED | Noted: 2019-10-03 | Resolved: 2024-02-21

## 2024-06-28 ENCOUNTER — TELEPHONE (OUTPATIENT)
Dept: FAMILY MEDICINE CLINIC | Facility: CLINIC | Age: 84
End: 2024-06-28

## 2025-02-07 ENCOUNTER — TELEPHONE (OUTPATIENT)
Dept: FAMILY MEDICINE CLINIC | Facility: CLINIC | Age: 85
End: 2025-02-07